# Patient Record
Sex: FEMALE | Race: BLACK OR AFRICAN AMERICAN | Employment: OTHER | URBAN - METROPOLITAN AREA
[De-identification: names, ages, dates, MRNs, and addresses within clinical notes are randomized per-mention and may not be internally consistent; named-entity substitution may affect disease eponyms.]

---

## 2019-08-13 ENCOUNTER — HOSPITAL ENCOUNTER (OUTPATIENT)
Dept: PHYSICAL THERAPY | Age: 64
Discharge: HOME | End: 2019-08-13
Payer: MEDICARE

## 2019-08-13 ENCOUNTER — APPOINTMENT (OUTPATIENT)
Dept: PHYSICAL THERAPY | Age: 64
End: 2019-08-13
Payer: MEDICARE

## 2019-08-13 PROCEDURE — 97140 MANUAL THERAPY 1/> REGIONS: CPT

## 2019-08-13 PROCEDURE — 97166 OT EVAL MOD COMPLEX 45 MIN: CPT

## 2019-08-13 NOTE — THERAPY EVALUATION
[address] [facility]  : 1955  [place]  Secondary: Bshsi Generic [address] [facility]  at [address] [facility]  [address], [address]  [phone]   [phone]           OUTPATIENT OCCUPATIONAL THERAPY: Initial Assessment and Daily Note 2019    ICD-10: Treatment Diagnosis: I89.0 lymphedema not elsewhere classified, M79.604 pain in right leg, M79.605 pain in left leg   Precautions/Allergies:   Augmentin [amoxicillin-pot clavulanate]   Fall Risk Score:    Ambulatory/Rehab Services H2 Model Falls Risk Assessment    Risk Factors:       No Risk Factors Identified Ability to Rise from Chair:       (1)  Pushes up, successful in one attempt    Falls Prevention Plan:       No modifications necessary   Total: (5 or greater = High Risk): 1     [employer]. All Rights Reserved. [facility] [employer] Patent #[id number]. Federal Law prohibits the replication, distribution or use without written permission from [employer].     MD Orders: eval and treat MEDICAL/REFERRING DIAGNOSIS:   Lymphedema, not elsewhere classified [I89.0]   DATE OF ONSET: chronic with worsening symptoms over the past 2 years    REFERRING PHYSICIAN: Parrish Doty MD  RETURN PHYSICIAN APPOINTMENT: to be determined      INITIAL ASSESSMENT:  Ms. Christi Guzman was referred to OT for the evaluation and treatment of bilateral LE lymphedema. She states swelling is chronic in nature and has gotten worse as her vein issues have gotten worse. She is being seen at [facility] to address her vein issues. DVT was ruled out. She wore 20-30mmHg compression knee highs for > 4 weeks with little success. LE pain, tingling is also present and along with the swelling has decreased her LE tolerance needed for standing and walking. She presents with Stage 2 LE lymphedema with dense fluid/pitting around the ankles, hyperpigmentation and pain.  She would benefit from OT for complete decongestive therapy to reduce swelling before transitioning to compression garments and home program.    PLAN OF CARE:   PROBLEM LIST:  1. Increased Pain  2. Decreased Activity Tolerance  3. Edema/Girth INTERVENTIONS PLANNED  1. Skin care  2. Compression bandaging  3. Fitting for compression garment(s)  4. Manual therapy/Manual lymph drainage  5. Therapeutic exercise/Therapeutic activities  6. Patient Education  7. Compression pump trial prn  8.  kinesiotaping    TREATMENT PLAN:  Effective Dates: 11/11/2019 Frequency/Duration: 2x/week up to 90 days  2 xweek x90 days  and upon reassessment. Will adjust frequency and duration as progress indicates. GOALS: (Goals have been discussed and agreed upon with patient.)  Short-Term Functional Goals: Time Frame: 45 days  1. The patient/caregiver will verbalize understanding of lymphedema precautions. 2. Patient will be independent with skin care regimen to decrease risk of cellulitis. 3. The patient/caregiver will be independent at donning and doffing bilatera lower extremity compression bandages. 4. The patient/caregiver will be independent with self-manual lymph drainage techniques and show decrease in limb volume. 5. Patient will be independent in lymphatic exercises. Discharge Goals: Time Frame: 90 days  1. Patient's bilateral lower extremity circumferential measurements will decrease on volumetric graph by 10-15cm to maximize functional use in ADL's.    2. The patient/caregiver will be independent with home management of lymphedema. 3. Patient/caregiver will be independent donning and doffing bilateral lower extremity compression garment. Rehabilitation Potential For Stated Goals: Good  Regarding [address] [facility] [address] therapy, I certify that the treatment plan above will be carried out by a therapist or under their direction.   Thank you for this referral,  Yevonne Kanner, OT     Referring Physician Signature: Osman Krishnan Ashlee Nash MD _________________________  Date _________            The information in this section was collected on 8/13/2019   (except where otherwise noted). OCCUPATIONAL PROFILE & HISTORY:   History of Present Injury/Illness (Reason for Referral):  Ms. Malick Godinez was referred to OT for the evaluation and treatment of bilateral LE lymphedema. She states swelling is chronic in nature and has gotten worse as her vein issues have gotten worse. She is being seen at [facility] to address her vein issues. DVT was ruled out. She wore 20-30mmHg compression knee highs for > 4 weeks with little success. LE pain, tingling is also present and along with the swelling has decreased her LE tolerance needed for standing and walking. She presents with Stage 2 LE lymphedema with dense fluid/pitting around the ankles, hyperpigmentation and pain. She would benefit from OT for complete decongestive therapy to reduce swelling before transitioning to compression garments and home program.   Past Medical History/Comorbidities:   Ms. Malick Godinez  has a past medical history of Hypertension and Thyroid Nodule. Ms. Malick Godinez  has a past surgical history that includes hx orthopaedic. PMH: diabetes, high blood pressure, high cholesterol, varicose veins,leg swelling and pain, lymphedema, DVT ruled out   Social History/Living Environment:     pt lives in home with spouse   Prior Level of Function/Work/Activity:  Retired / - has not been very active since retiring   Dominant Side:         RIGHT  Previous Treatment Approaches:          Conservative therapy : >4 weeks of compression knee highs   Current Medications:    Current Outpatient Medications:     loratadine (CLARITIN REDITABS) 10 mg dissolvable tablet, take 10 mg by mouth daily. , Disp: , Rfl:     lisinopril-hydrochlorothiazide (PRINZIDE, ZESTORETIC) 20-12.5 mg per tablet, take  by mouth daily.  2 tablets daily , Disp: , Rfl:     spironolactone (ALDACTONE) 25 mg tablet, take 25 mg by mouth daily. , Disp: , Rfl:     amlodipine-atorvastatatin (CADUET) 5-10 mg per tablet, take 1 Tab by mouth daily. , Disp: , Rfl:     metoprolol-XL (TOPROL-XL) 100 mg XL tablet, take 100 mg by mouth daily. , Disp: , Rfl:             Date Last Reviewed:  8/13/2019   Complexity Level : Expanded review of therapy/medical records (1-2):  MODERATE COMPLEXITY   ASSESSMENT OF OCCUPATIONAL PERFORMANCE:   Palpation:          Bilateral stage 2 lymphedema with greater involvement in the RLE: fluid has accumulated and is dense/pitting around ankles   ROM:          WFL  Strength:       WFL  Skin Integrity:          Skin is intact with hyperpigmentation and thickening present - multiple varicosities   Sensation:  Intact per pt   Functional Mobility:  Independent but with decreased activity tolerance  Activities of Daily Living : independent   Edema/Girth:  2+   PRETREATMENT AFFECTED LIMB(s): lower extremity      Date:  8-13-19         Right / Left           Groin   []      []           8 inches   []      []           4 inches   []      []         PoplitealSpace   [x]      [x] 47/48          8 inches   [x]      [x] 43/41          4 inches   [x]      [x] 33/30          Ankle   [x]      [x] 30.5/28          Instep   [x]      [x] 24.5/24.5        Measurements are taken in centimeters:  2.54 cm = 1 inch  Total:right 178cm        left 171.5              Physical Skills Involved:  1. Activity Tolerance  2. Pain (Chronic)  3. Edema  4. Skin Integrity Cognitive Skills Affected (resulting in the inability to perform in a timely and safe manner):  1. Psychosocial Skills Affected:  1. Habits/Routines  2. Social Interaction   Number of elements that affect the Plan of Care: 3-5:  MODERATE COMPLEXITY   CLINICAL DECISION MAKING:   Outcome Measure: Tool Used: Tool Used: Lymphedema Life Impact Scale   Score:  Initial: 48 Most Recent: X (Date: -- )   Interpretation of Score:  The Lymphedema Life Impact Scale (LLIS) is a validated instrument that measures the physical, functional, and psychosocial concerns pertinent to patients with extremity lymphedema. The Scale's questionnaire is administered to patients to gauge impairments, activity limitations, and participation restrictions resulting from their lymphedema. Score 0 1-13 14-26 27-40 41-54 55-67 68   Modifier CH CI CJ CK CL CM CN     ? Other PT/OT Primary Functional Limitations:     - CURRENT STATUS: CL - 60%-79% impaired, limited or restricted    - GOAL STATUS: CK - 40%-59% impaired, limited or restricted    - D/C STATUS:  ---------------To be determined---------------       Medical Necessity:   · Skilled intervention continues to be required due to uncontrolled swelling increasing risk of cellulitis and hindering ADL's. Reason for Services/Other Comments:  · Patient continues to require skilled intervention due to inability to self manage lymphedema at this time. Clinical Decision-Making Assessment:     Use of outcome tool(s) and clinical judgement create a POC that gives a: Questionable prediction of patient's progress: MODERATE COMPLEXITY   TREATMENT:   (In addition to Assessment/Re-Assessment sessions the following treatments were rendered)    Pre-treatment Symptoms/Complaints:  Bilateral LE lymphedema , LE pain, decreased LE activity tolerance   Pain: Initial:   Pain Intensity 1: 8  Post Session:  8   Occupational Therapy Treatments:    OT eval(x  ) OT eval was completed. Pt received information on lymphedema and risk reduction/self management practices as outlined by the National Lymphedema Network. Therapeutic Exercise ( minutes):     HEP:  As above; handouts given to patient for all exercises. Manual Therapy:(30 min): Pt was educated on lymph pathology, CDT, skin integrity management and precautions. Treatment was initiated with skin care and MLD. Pt has vein procedure Thursday so will hold off on compression until after procedure. Manual Lymph Drainage:(30minutes) MLD to decrease bilateral LE lymphedema with instruction on deep breathing and self MLD          Lymph Nodes:    Cervical Supraclavicular Axillary Abdominal Inguinal Popliteal Antecubital   RIGHT []     [x]     [x]     [x]     [x]     [x]     []       LEFT []     [x]     [x]     [x]     [x]     [x]     []          Anastamoses:   Axillo-axillary Inguino-inguinal Axillo-inguinal Inguino-axillary   ANTERIOR []     []     []     [x]       POSTERIOR []     []     []     []       RIGHT []     []     []     [x]       LEFT []     []     []     [x]         Limbs:   []    RUE     []    LUE     [x]    RLE    [x]    LLE   Compression: Will initiate compression bandaging at next session. Treatment/Session Assessment:    · Response to Treatment:  Pt.'s goals for therapy is to decrease swelling and pain to improve her LE endurance needed for standing, walking. She has good support system in spouse and agrees with POC. Treatment will begin next week since she has vein procedure Thursday. · Compliance with Program/Exercises: Will assess as treatment progresses. · Recommendations/Intent for next treatment session: \"Next visit will focus on complete decongestive therapy\".   Total Treatment Duration:60min  OT Patient Time In/Time Out  Time In: 0100  Time Out: [address], OT

## 2019-08-19 ENCOUNTER — APPOINTMENT (OUTPATIENT)
Dept: PHYSICAL THERAPY | Age: 64
End: 2019-08-19
Payer: MEDICARE

## 2019-08-19 ENCOUNTER — HOSPITAL ENCOUNTER (OUTPATIENT)
Dept: PHYSICAL THERAPY | Age: 64
Discharge: HOME | End: 2019-08-19
Payer: MEDICARE

## 2019-08-22 ENCOUNTER — HOSPITAL ENCOUNTER (OUTPATIENT)
Dept: PHYSICAL THERAPY | Age: 64
Discharge: HOME | End: 2019-08-22
Payer: MEDICARE

## 2019-08-22 PROCEDURE — 97140 MANUAL THERAPY 1/> REGIONS: CPT

## 2019-08-22 NOTE — PROGRESS NOTES
Trinity Kaminski  : 1955  [place]  Secondary: Bshsi Generic [address] at [facility]  [address], [facility], [address]  [phone]   [phone]           OUTPATIENT OCCUPATIONAL THERAPY: Daily Note 2019    ICD-10: Treatment Diagnosis: I89.0 lymphedema not elsewhere classified, M79.604 pain in right leg, M79.605 pain in left leg   Precautions/Allergies:   Augmentin [amoxicillin-pot clavulanate]   Fall Risk Score:    Ambulatory/Rehab Services H2 Model Falls Risk Assessment    Risk Factors:       No Risk Factors Identified Ability to Rise from Chair:       (1)  Pushes up, successful in one attempt    Falls Prevention Plan:       No modifications necessary   Total: (5 or greater = High Risk): 1     [employer]. All Rights Reserved. [facility] [employer] Patent #[id number]. Federal Law prohibits the replication, distribution or use without written permission from [employer]     MD Orders: eval and treat MEDICAL/REFERRING DIAGNOSIS:   Lymphedema, not elsewhere classified [I89.0]   DATE OF ONSET: chronic with worsening symptoms over the past 2 years    REFERRING PHYSICIAN: Rea Gill MD  RETURN PHYSICIAN APPOINTMENT: to be determined      INITIAL ASSESSMENT:  Ms. Shayna Duenas was referred to OT for the evaluation and treatment of bilateral LE lymphedema. She states swelling is chronic in nature and has gotten worse as her vein issues have gotten worse. She is being seen at [facility] to address her vein issues. DVT was ruled out. She wore 20-30mmHg compression knee highs for > 4 weeks with little success. LE pain, tingling is also present and along with the swelling has decreased her LE tolerance needed for standing and walking. She presents with Stage 2 LE lymphedema with dense fluid/pitting around the ankles, hyperpigmentation and pain.  She would benefit from OT for complete decongestive therapy to reduce swelling before transitioning to compression garments and home program.    PLAN OF CARE:   PROBLEM LIST:  1. Increased Pain  2. Decreased Activity Tolerance  3. Edema/Girth INTERVENTIONS PLANNED  1. Skin care  2. Compression bandaging  3. Fitting for compression garment(s)  4. Manual therapy/Manual lymph drainage  5. Therapeutic exercise/Therapeutic activities  6. Patient Education  7. Compression pump trial prn  8.  kinesiotaping    TREATMENT PLAN:  Effective Dates: 11/11/2019 Frequency/Duration: 2x/week up to 90 days  2 xweek x90 days  and upon reassessment. Will adjust frequency and duration as progress indicates. GOALS: (Goals have been discussed and agreed upon with patient.)  Short-Term Functional Goals: Time Frame: 45 days  1. The patient/caregiver will verbalize understanding of lymphedema precautions. 2. Patient will be independent with skin care regimen to decrease risk of cellulitis. 3. The patient/caregiver will be independent at donning and doffing bilatera lower extremity compression bandages. 4. The patient/caregiver will be independent with self-manual lymph drainage techniques and show decrease in limb volume. 5. Patient will be independent in lymphatic exercises. Discharge Goals: Time Frame: 90 days  1. Patient's bilateral lower extremity circumferential measurements will decrease on volumetric graph by 10-15cm to maximize functional use in ADL's.    2. The patient/caregiver will be independent with home management of lymphedema. 3. Patient/caregiver will be independent donning and doffing bilateral lower extremity compression garment. Rehabilitation Potential For Stated Goals: Good  Regarding [address] [place] [address] Road therapy, I certify that the treatment plan above will be carried out by a therapist or under their direction.   Thank you for this referral,  Ras Lopez OT                 The information in this section was collected on 8/22/2019   (except where otherwise noted). OCCUPATIONAL PROFILE & HISTORY:   History of Present Injury/Illness (Reason for Referral):  Ms. Janneth Quijano was referred to OT for the evaluation and treatment of bilateral LE lymphedema. She states swelling is chronic in nature and has gotten worse as her vein issues have gotten worse. She is being seen at [facility] to address her vein issues. DVT was ruled out. She wore 20-30mmHg compression knee highs for > 4 weeks with little success. LE pain, tingling is also present and along with the swelling has decreased her LE tolerance needed for standing and walking. She presents with Stage 2 LE lymphedema with dense fluid/pitting around the ankles, hyperpigmentation and pain. She would benefit from OT for complete decongestive therapy to reduce swelling before transitioning to compression garments and home program.   Past Medical History/Comorbidities:   Ms. Janneth Quijano  has a past medical history of Hypertension and Thyroid Nodule. Ms. Janneth Quijano  has a past surgical history that includes hx orthopaedic. PMH: diabetes, high blood pressure, high cholesterol, varicose veins,leg swelling and pain, lymphedema, DVT ruled out   Social History/Living Environment:     pt lives in home with spouse   Prior Level of Function/Work/Activity:  Retired / - has not been very active since retiring   Dominant Side:         RIGHT  Previous Treatment Approaches:          Conservative therapy : >4 weeks of compression knee highs   Current Medications:    Current Outpatient Medications:     loratadine (CLARITIN REDITABS) 10 mg dissolvable tablet, take 10 mg by mouth daily. , Disp: , Rfl:     lisinopril-hydrochlorothiazide (PRINZIDE, ZESTORETIC) 20-12.5 mg per tablet, take  by mouth daily. 2 tablets daily , Disp: , Rfl:     spironolactone (ALDACTONE) 25 mg tablet, take 25 mg by mouth daily. , Disp: , Rfl:     amlodipine-atorvastatatin (CADUET) 5-10 mg per tablet, take 1 Tab by mouth daily. , Disp: , Rfl:     metoprolol-XL (TOPROL-XL) 100 mg XL tablet, take 100 mg by mouth daily. , Disp: , Rfl:             Date Last Reviewed:  8/22/2019   Complexity Level : Expanded review of therapy/medical records (1-2):  MODERATE COMPLEXITY   ASSESSMENT OF OCCUPATIONAL PERFORMANCE:   Palpation:          Bilateral stage 2 lymphedema with greater involvement in the RLE: fluid has accumulated and is dense/pitting around ankles   ROM:          WFL  Strength:       WFL  Skin Integrity:          Skin is intact with hyperpigmentation and thickening present - multiple varicosities   Sensation:  Intact per pt   Functional Mobility:  Independent but with decreased activity tolerance  Activities of Daily Living : independent   Edema/Girth:  2+   PRETREATMENT AFFECTED LIMB(s): lower extremity      Date:  8-13-19         Right / Left           Groin   []      []           8 inches   []      []           4 inches   []      []         PoplitealSpace   [x]      [x] 47/48          8 inches   [x]      [x] 43/41          4 inches   [x]      [x] 33/30          Ankle   [x]      [x] 30.5/28          Instep   [x]      [x] 24.5/24.5        Measurements are taken in centimeters:  2.54 cm = 1 inch  Total:right 178cm        left 171.5              Physical Skills Involved:  1. Activity Tolerance  2. Pain (Chronic)  3. Edema  4. Skin Integrity Cognitive Skills Affected (resulting in the inability to perform in a timely and safe manner):  1. Psychosocial Skills Affected:  1. Habits/Routines  2. Social Interaction   Number of elements that affect the Plan of Care: 3-5:  MODERATE COMPLEXITY   CLINICAL DECISION MAKING:   Outcome Measure: Tool Used: Tool Used: Lymphedema Life Impact Scale   Score:  Initial: 48 Most Recent: X (Date: -- )   Interpretation of Score:  The Lymphedema Life Impact Scale (LLIS) is a validated instrument that measures the physical, functional, and psychosocial concerns pertinent to patients with extremity lymphedema. The Scale's questionnaire is administered to patients to gauge impairments, activity limitations, and participation restrictions resulting from their lymphedema. Score 0 1-13 14-26 27-40 41-54 55-67 68   Modifier CH CI CJ CK CL CM CN     ? Other PT/OT Primary Functional Limitations:     - CURRENT STATUS: CL - 60%-79% impaired, limited or restricted    - GOAL STATUS: CK - 40%-59% impaired, limited or restricted    - D/C STATUS:  ---------------To be determined---------------       Medical Necessity:   · Skilled intervention continues to be required due to uncontrolled swelling increasing risk of cellulitis and hindering ADL's. Reason for Services/Other Comments:  · Patient continues to require skilled intervention due to inability to self manage lymphedema at this time. Clinical Decision-Making Assessment:     Use of outcome tool(s) and clinical judgement create a POC that gives a: Questionable prediction of patient's progress: MODERATE COMPLEXITY   TREATMENT:   (In addition to Assessment/Re-Assessment sessions the following treatments were rendered)    Pre-treatment Symptoms/Complaints:  She canceled appt last week and only scheduled one appt this week due to having conflicting appts at [facility]. She has been cleared for therapy/badnaging while having appts at [facility]   Pain: Initial:   Pain Intensity 1: 8  Post Session:  8   Occupational Therapy Treatments:    OT eval(x  ) OT eval was completed 8-13-19. Pt received information on lymphedema and risk reduction/self management practices as outlined by the National Lymphedema Network. Therapeutic Exercise ( minutes):     HEP:  As above; handouts given to patient for all exercises. Manual Therapy:(60 min): Pt received MLD, skin care and compression bandaging with instruction on self MLD and self bandaging.            Manual Lymph Drainage:(30minutes) MLD to decrease bilateral LE lymphedema with instruction on deep breathing and self MLD          Lymph Nodes:    Cervical Supraclavicular Axillary Abdominal Inguinal Popliteal Antecubital   RIGHT []     [x]     [x]     [x]     [x]     [x]     []       LEFT []     [x]     [x]     [x]     [x]     [x]     []          Anastamoses:   Axillo-axillary Inguino-inguinal Axillo-inguinal Inguino-axillary   ANTERIOR []     []     []     [x]       POSTERIOR []     []     []     []       RIGHT []     []     []     [x]       LEFT []     []     []     [x]         Limbs:   []    RUE     []    LUE     [x]    RLE    [x]    LLE   Compression: After applying Eucerin lotion, a multi layered compression bandage was applied to bilateral LE's from foot to knee over lymphesoft foam. She was instructed on principles/technqiues of self bandaging. Pt instructed to remove bandaging if any medical complications ie SOB. Treatment/Session Assessment:    · Response to Treatment:  Pt tolerated treatment session well with no medical complications. Skin was intact and addressed with Eucerin lotion. She was interested in learning how to bandage for home program.   · Compliance with Program/Exercises: compliant to date  · Recommendations/Intent for next treatment session: \"Next visit will focus on complete decongestive therapy\".   Total Treatment Duration:60min  OT Patient Time In/Time Out  Time In: 1100  Time Out: [address] [facility]

## 2019-08-27 ENCOUNTER — APPOINTMENT (OUTPATIENT)
Dept: PHYSICAL THERAPY | Age: 64
End: 2019-08-27
Payer: MEDICARE

## 2019-08-29 ENCOUNTER — HOSPITAL ENCOUNTER (OUTPATIENT)
Dept: PHYSICAL THERAPY | Age: 64
Discharge: HOME | End: 2019-08-29
Payer: MEDICARE

## 2019-08-29 PROCEDURE — 97140 MANUAL THERAPY 1/> REGIONS: CPT

## 2019-08-29 NOTE — PROGRESS NOTES
Luis Chandler  : 1955  [place]  Secondary: Bshsi Generic [address] [facility]  at [address], [address]  [phone]   [phone]           OUTPATIENT OCCUPATIONAL THERAPY: Daily Note 2019    ICD-10: Treatment Diagnosis: I89.0 lymphedema not elsewhere classified, M79.604 pain in right leg, M79.605 pain in left leg   Precautions/Allergies:   Augmentin [amoxicillin-pot clavulanate]   Fall Risk Score:    Ambulatory/Rehab Services H2 Model Falls Risk Assessment    Risk Factors:       No Risk Factors Identified Ability to Rise from Chair:       (1)  Pushes up, successful in one attempt    Falls Prevention Plan:       No modifications necessary   Total: (5 or greater = High Risk): 1     [employer]. All Rights Reserved. [facility] [employer] Patent #[id number]. Federal Law prohibits the replication, distribution or use without written permission from [employer]     MD Orders: eval and treat MEDICAL/REFERRING DIAGNOSIS:   Lymphedema, not elsewhere classified [I89.0]   DATE OF ONSET: chronic with worsening symptoms over the past 2 years    REFERRING PHYSICIAN: Solange Fisher MD  RETURN PHYSICIAN APPOINTMENT: to be determined      INITIAL ASSESSMENT:  Ms. Александр Haas was referred to OT for the evaluation and treatment of bilateral LE lymphedema. She states swelling is chronic in nature and has gotten worse as her vein issues have gotten worse. She is being seen at [facility] to address her vein issues. DVT was ruled out. She wore 20-30mmHg compression knee highs for > 4 weeks with little success. LE pain, tingling is also present and along with the swelling has decreased her LE tolerance needed for standing and walking. She presents with Stage 2 LE lymphedema with dense fluid/pitting around the ankles, hyperpigmentation and pain.  She would benefit from OT for complete decongestive therapy to reduce swelling before transitioning to compression garments and home program.    PLAN OF CARE:   PROBLEM LIST:  1. Increased Pain  2. Decreased Activity Tolerance  3. Edema/Girth INTERVENTIONS PLANNED  1. Skin care  2. Compression bandaging  3. Fitting for compression garment(s)  4. Manual therapy/Manual lymph drainage  5. Therapeutic exercise/Therapeutic activities  6. Patient Education  7. Compression pump trial prn  8.  kinesiotaping    TREATMENT PLAN:  Effective Dates: 11/11/2019 Frequency/Duration: 2x/week up to 90 days  2 xweek x90 days  and upon reassessment. Will adjust frequency and duration as progress indicates. GOALS: (Goals have been discussed and agreed upon with patient.)  Short-Term Functional Goals: Time Frame: 45 days  1. The patient/caregiver will verbalize understanding of lymphedema precautions. 2. Patient will be independent with skin care regimen to decrease risk of cellulitis. 3. The patient/caregiver will be independent at donning and doffing bilatera lower extremity compression bandages. 4. The patient/caregiver will be independent with self-manual lymph drainage techniques and show decrease in limb volume. 5. Patient will be independent in lymphatic exercises. Discharge Goals: Time Frame: 90 days  1. Patient's bilateral lower extremity circumferential measurements will decrease on volumetric graph by 10-15cm to maximize functional use in ADL's.    2. The patient/caregiver will be independent with home management of lymphedema. 3. Patient/caregiver will be independent donning and doffing bilateral lower extremity compression garment. Rehabilitation Potential For Stated Goals: Good  Regarding [address] [facility] [address] therapy, I certify that the treatment plan above will be carried out by a therapist or under their direction.   Thank you for this referral,  Geovanna Moya OT                 The information in this section was collected on 8/29/2019   (except where otherwise noted). OCCUPATIONAL PROFILE & HISTORY:   History of Present Injury/Illness (Reason for Referral):  Ms. Mynor Feliciano was referred to OT for the evaluation and treatment of bilateral LE lymphedema. She states swelling is chronic in nature and has gotten worse as her vein issues have gotten worse. She is being seen at [facility] to address her vein issues. DVT was ruled out. She wore 20-30mmHg compression knee highs for > 4 weeks with little success. LE pain, tingling is also present and along with the swelling has decreased her LE tolerance needed for standing and walking. She presents with Stage 2 LE lymphedema with dense fluid/pitting around the ankles, hyperpigmentation and pain. She would benefit from OT for complete decongestive therapy to reduce swelling before transitioning to compression garments and home program.   Past Medical History/Comorbidities:   Ms. Mynor Feliciano  has a past medical history of Hypertension and Thyroid Nodule. Ms. Mynor Feliciano  has a past surgical history that includes hx orthopaedic. PMH: diabetes, high blood pressure, high cholesterol, varicose veins,leg swelling and pain, lymphedema, DVT ruled out   Social History/Living Environment:     pt lives in home with spouse   Prior Level of Function/Work/Activity:  Retired / - has not been very active since retiring   Dominant Side:         RIGHT  Previous Treatment Approaches:          Conservative therapy : >4 weeks of compression knee highs   Current Medications:    Current Outpatient Medications:     loratadine (CLARITIN REDITABS) 10 mg dissolvable tablet, take 10 mg by mouth daily. , Disp: , Rfl:     lisinopril-hydrochlorothiazide (PRINZIDE, ZESTORETIC) 20-12.5 mg per tablet, take  by mouth daily. 2 tablets daily , Disp: , Rfl:     spironolactone (ALDACTONE) 25 mg tablet, take 25 mg by mouth daily. , Disp: , Rfl:     amlodipine-atorvastatatin (CADUET) 5-10 mg per tablet, take 1 Tab by mouth daily. , Disp: , Rfl:     metoprolol-XL (TOPROL-XL) 100 mg XL tablet, take 100 mg by mouth daily. , Disp: , Rfl:             Date Last Reviewed:  8/29/2019   Complexity Level : Expanded review of therapy/medical records (1-2):  MODERATE COMPLEXITY   ASSESSMENT OF OCCUPATIONAL PERFORMANCE:   Palpation:          Bilateral stage 2 lymphedema with greater involvement in the RLE: fluid has accumulated and is dense/pitting around ankles   ROM:          WFL  Strength:       WFL  Skin Integrity:          Skin is intact with hyperpigmentation and thickening present - multiple varicosities   Sensation:  Intact per pt   Functional Mobility:  Independent but with decreased activity tolerance  Activities of Daily Living : independent   Edema/Girth:  2+   PRETREATMENT AFFECTED LIMB(s): lower extremity      Date:  8-13-19         Right / Left           Groin   []      []           8 inches   []      []           4 inches   []      []         PoplitealSpace   [x]      [x] 47/48          8 inches   [x]      [x] 43/41          4 inches   [x]      [x] 33/30          Ankle   [x]      [x] 30.5/28          Instep   [x]      [x] 24.5/24.5        Measurements are taken in centimeters:  2.54 cm = 1 inch  Total:right 178cm        left 171.5              Physical Skills Involved:  1. Activity Tolerance  2. Pain (Chronic)  3. Edema  4. Skin Integrity Cognitive Skills Affected (resulting in the inability to perform in a timely and safe manner):  1. Psychosocial Skills Affected:  1. Habits/Routines  2. Social Interaction   Number of elements that affect the Plan of Care: 3-5:  MODERATE COMPLEXITY   CLINICAL DECISION MAKING:   Outcome Measure: Tool Used: Tool Used: Lymphedema Life Impact Scale   Score:  Initial: 48 Most Recent: X (Date: -- )   Interpretation of Score:  The Lymphedema Life Impact Scale (LLIS) is a validated instrument that measures the physical, functional, and psychosocial concerns pertinent to patients with extremity lymphedema. The Scale's questionnaire is administered to patients to gauge impairments, activity limitations, and participation restrictions resulting from their lymphedema. Score 0 1-13 14-26 27-40 41-54 55-67 68   Modifier CH CI CJ CK CL CM CN     ? Other PT/OT Primary Functional Limitations:     - CURRENT STATUS: CL - 60%-79% impaired, limited or restricted    - GOAL STATUS: CK - 40%-59% impaired, limited or restricted    - D/C STATUS:  ---------------To be determined---------------       Medical Necessity:   · Skilled intervention continues to be required due to uncontrolled swelling increasing risk of cellulitis and hindering ADL's. Reason for Services/Other Comments:  · Patient continues to require skilled intervention due to inability to self manage lymphedema at this time. Clinical Decision-Making Assessment:     Use of outcome tool(s) and clinical judgement create a POC that gives a: Questionable prediction of patient's progress: MODERATE COMPLEXITY   TREATMENT:   (In addition to Assessment/Re-Assessment sessions the following treatments were rendered)    Pre-treatment Symptoms/Complaints:  Pt is practicing self bandaging at home. She is knowledgeable of principles and techniques. Pain decreased by 1 grade. Pain: Initial:   Pain Intensity 1: 7  Post Session: 7   Occupational Therapy Treatments:    OT eval(x  ) OT eval was completed 8-13-19. Pt received information on lymphedema and risk reduction/self management practices as outlined by the National Lymphedema Network. Therapeutic Exercise ( minutes):     HEP:  As above; handouts given to patient for all exercises. Manual Therapy:(60 min): Pt received MLD, skin care and compression bandaging with instruction on self MLD and self bandaging.            Manual Lymph Drainage:(60minutes) MLD to decrease bilateral LE lymphedema with instruction on deep breathing and self MLD          Lymph Nodes:    Cervical Supraclavicular Axillary Abdominal Inguinal Popliteal Antecubital   RIGHT []     [x]     [x]     [x]     [x]     [x]     []       LEFT []     [x]     [x]     [x]     [x]     [x]     []          Anastamoses:   Axillo-axillary Inguino-inguinal Axillo-inguinal Inguino-axillary   ANTERIOR []     []     []     [x]       POSTERIOR []     []     []     []       RIGHT []     []     []     [x]       LEFT []     []     []     [x]         Limbs:   []    RUE     []    LUE     [x]    RLE    [x]    LLE   Compression: After applying Eucerin lotion, a multi layered compression bandage was applied to bilateral LE's from foot to knee over lymphesoft foam. She was instructed on principles/technqiues of self bandaging. Pt instructed to remove bandaging if any medical complications ie SOB. Treatment/Session Assessment:    · Response to Treatment:  Pt tolerated treatment session well with no medical complications. Skin was intact and addressed with Eucerin lotion. She is tolerating bandages well and pain has decreased by 1 grade. · Compliance with Program/Exercises: compliant to date  · Recommendations/Intent for next treatment session: \"Next visit will focus on complete decongestive therapy\".   Total Treatment Duration:60min  OT Patient Time In/Time Out  Time In: 0120  Time Out: 7 [facility]

## 2019-09-03 ENCOUNTER — APPOINTMENT (OUTPATIENT)
Dept: PHYSICAL THERAPY | Age: 64
End: 2019-09-03
Payer: MEDICARE

## 2019-09-05 ENCOUNTER — HOSPITAL ENCOUNTER (OUTPATIENT)
Dept: PHYSICAL THERAPY | Age: 64
Discharge: HOME | End: 2019-09-05
Payer: MEDICARE

## 2019-09-05 PROCEDURE — 97140 MANUAL THERAPY 1/> REGIONS: CPT

## 2019-09-05 NOTE — PROGRESS NOTES
Pravin Steele  : 1955  [place]  Secondary: Bshsi Generic [address] at [facility] Therapy  [address], [facility], [address]  [phone]   [phone]           OUTPATIENT OCCUPATIONAL THERAPY: Daily Note 2019    ICD-10: Treatment Diagnosis: I89.0 lymphedema not elsewhere classified, M79.604 pain in right leg, M79.605 pain in left leg   Precautions/Allergies:   Augmentin [amoxicillin-pot clavulanate]   Fall Risk Score:    Ambulatory/Rehab Services H2 Model Falls Risk Assessment    Risk Factors:       No Risk Factors Identified Ability to Rise from Chair:       (1)  Pushes up, successful in one attempt    Falls Prevention Plan:       No modifications necessary   Total: (5 or greater = High Risk): 1     [facility] [employer]. All Rights Reserved. [facility] [employer] Patent #[id number]. Federal Law prohibits the replication, distribution or use without written permission from [employer]     MD Orders: eval and treat MEDICAL/REFERRING DIAGNOSIS:   Lymphedema, not elsewhere classified [I89.0]   DATE OF ONSET: chronic with worsening symptoms over the past 2 years    REFERRING PHYSICIAN: Kari Cobos MD  RETURN PHYSICIAN APPOINTMENT: to be determined      INITIAL ASSESSMENT:  Ms. Ambreen Cedillo was referred to OT for the evaluation and treatment of bilateral LE lymphedema. She states swelling is chronic in nature and has gotten worse as her vein issues have gotten worse. She is being seen at [facility] to address her vein issues. DVT was ruled out. She wore 20-30mmHg compression knee highs for > 4 weeks with little success. LE pain, tingling is also present and along with the swelling has decreased her LE tolerance needed for standing and walking. She presents with Stage 2 LE lymphedema with dense fluid/pitting around the ankles, hyperpigmentation and pain.  She would benefit from OT for complete decongestive therapy to reduce swelling before transitioning to compression garments and home program.    PLAN OF CARE:   PROBLEM LIST:  1. Increased Pain  2. Decreased Activity Tolerance  3. Edema/Girth INTERVENTIONS PLANNED  1. Skin care  2. Compression bandaging  3. Fitting for compression garment(s)  4. Manual therapy/Manual lymph drainage  5. Therapeutic exercise/Therapeutic activities  6. Patient Education  7. Compression pump trial prn  8.  kinesiotaping    TREATMENT PLAN:  Effective Dates: 11/11/2019 Frequency/Duration: 2x/week up to 90 days  2 xweek x90 days  and upon reassessment. Will adjust frequency and duration as progress indicates. GOALS: (Goals have been discussed and agreed upon with patient.)  Short-Term Functional Goals: Time Frame: 45 days  1. The patient/caregiver will verbalize understanding of lymphedema precautions. 2. Patient will be independent with skin care regimen to decrease risk of cellulitis. 3. The patient/caregiver will be independent at donning and doffing bilatera lower extremity compression bandages. 4. The patient/caregiver will be independent with self-manual lymph drainage techniques and show decrease in limb volume. 5. Patient will be independent in lymphatic exercises. Discharge Goals: Time Frame: 90 days  1. Patient's bilateral lower extremity circumferential measurements will decrease on volumetric graph by 10-15cm to maximize functional use in ADL's.    2. The patient/caregiver will be independent with home management of lymphedema. 3. Patient/caregiver will be independent donning and doffing bilateral lower extremity compression garment. Rehabilitation Potential For Stated Goals: Good  Regarding [address] [place] [address] Road therapy, I certify that the treatment plan above will be carried out by a therapist or under their direction.   Thank you for this referral,  Malia Morris OT                 The information in this section was collected on 9/5/2019   (except where otherwise noted). OCCUPATIONAL PROFILE & HISTORY:   History of Present Injury/Illness (Reason for Referral):  Ms. Mynor Feliciano was referred to OT for the evaluation and treatment of bilateral LE lymphedema. She states swelling is chronic in nature and has gotten worse as her vein issues have gotten worse. She is being seen at [facility] to address her vein issues. DVT was ruled out. She wore 20-30mmHg compression knee highs for > 4 weeks with little success. LE pain, tingling is also present and along with the swelling has decreased her LE tolerance needed for standing and walking. She presents with Stage 2 LE lymphedema with dense fluid/pitting around the ankles, hyperpigmentation and pain. She would benefit from OT for complete decongestive therapy to reduce swelling before transitioning to compression garments and home program.   Past Medical History/Comorbidities:   Ms. Mynor Feliciano  has a past medical history of Hypertension and Thyroid Nodule. Ms. Mynor Feliciano  has a past surgical history that includes hx orthopaedic. PMH: diabetes, high blood pressure, high cholesterol, varicose veins,leg swelling and pain, lymphedema, DVT ruled out   Social History/Living Environment:     pt lives in home with spouse   Prior Level of Function/Work/Activity:  Retired / - has not been very active since retiring   Dominant Side:         RIGHT  Previous Treatment Approaches:          Conservative therapy : >4 weeks of compression knee highs   Current Medications:    Current Outpatient Medications:     loratadine (CLARITIN REDITABS) 10 mg dissolvable tablet, take 10 mg by mouth daily. , Disp: , Rfl:     lisinopril-hydrochlorothiazide (PRINZIDE, ZESTORETIC) 20-12.5 mg per tablet, take  by mouth daily. 2 tablets daily , Disp: , Rfl:     spironolactone (ALDACTONE) 25 mg tablet, take 25 mg by mouth daily. , Disp: , Rfl:     amlodipine-atorvastatatin (CADUET) 5-10 mg per tablet, take 1 Tab by mouth daily. , Disp: , Rfl:     metoprolol-XL (TOPROL-XL) 100 mg XL tablet, take 100 mg by mouth daily. , Disp: , Rfl:             Date Last Reviewed:  9/5/2019   Complexity Level : Expanded review of therapy/medical records (1-2):  MODERATE COMPLEXITY   ASSESSMENT OF OCCUPATIONAL PERFORMANCE:   Palpation:          Bilateral stage 2 lymphedema with greater involvement in the RLE: fluid has accumulated and is dense/pitting around ankles   ROM:          WFL  Strength:       WFL  Skin Integrity:          Skin is intact with hyperpigmentation and thickening present - multiple varicosities   Sensation:  Intact per pt   Functional Mobility:  Independent but with decreased activity tolerance  Activities of Daily Living : independent   Edema/Girth:  2+   PRETREATMENT AFFECTED LIMB(s): lower extremity      Date:  8-13-19 9-5-19        Right / Left           Groin   []      []           8 inches   []      []           4 inches   []      []         PoplitealSpace   [x]      [x] 47/48 44/44         8 inches   [x]      [x] 43/41 38/38         4 inches   [x]      [x] 33/30 31.5/28.5         Ankle   [x]      [x] 30.5/28 27/28         Instep   [x]      [x] 24.5/24.5 23/23       Measurements are taken in centimeters:  2.54 cm = 1 inch  Total:right 178cm 163.5       left 171.5 161.5             Physical Skills Involved:  1. Activity Tolerance  2. Pain (Chronic)  3. Edema  4. Skin Integrity Cognitive Skills Affected (resulting in the inability to perform in a timely and safe manner):  1. Psychosocial Skills Affected:  1. Habits/Routines  2. Social Interaction   Number of elements that affect the Plan of Care: 3-5:  MODERATE COMPLEXITY   CLINICAL DECISION MAKING:   Outcome Measure: Tool Used: Tool Used: Lymphedema Life Impact Scale   Score:  Initial: 48 Most Recent: X (Date: -- )   Interpretation of Score:  The Lymphedema Life Impact Scale (LLIS) is a validated instrument that measures the physical, functional, and psychosocial concerns pertinent to patients with extremity lymphedema. The Scale's questionnaire is administered to patients to gauge impairments, activity limitations, and participation restrictions resulting from their lymphedema. Score 0 1-13 14-26 27-40 41-54 55-67 68   Modifier CH CI CJ CK CL CM CN     ? Other PT/OT Primary Functional Limitations:     - CURRENT STATUS: CL - 60%-79% impaired, limited or restricted    - GOAL STATUS: CK - 40%-59% impaired, limited or restricted    - D/C STATUS:  ---------------To be determined---------------       Medical Necessity:   · Skilled intervention continues to be required due to uncontrolled swelling increasing risk of cellulitis and hindering ADL's. Reason for Services/Other Comments:  · Patient continues to require skilled intervention due to inability to self manage lymphedema at this time. Clinical Decision-Making Assessment:     Use of outcome tool(s) and clinical judgement create a POC that gives a: Questionable prediction of patient's progress: MODERATE COMPLEXITY   TREATMENT:   (In addition to Assessment/Re-Assessment sessions the following treatments were rendered)    Pre-treatment Symptoms/Complaints:  Overall total of circumferential measurements decreased by 14.5cm in the RLE and 10cm in the LLE. Pain has decreased by 3grades. Pain: Initial:   Pain Intensity 1: 5  Post Session: 5   Occupational Therapy Treatments:    OT eval(x  ) OT eval was completed 8-13-19. Pt received information on lymphedema and risk reduction/self management practices as outlined by the National Lymphedema Network. Therapeutic Exercise ( minutes):     HEP:  As above; handouts given to patient for all exercises. Manual Therapy:(60 min): Pt received MLD, skin care and compression bandaging with instruction on LE exercises to promote lymph flow. Measurements made of LE's.            Manual Lymph Drainage:(60minutes) MLD to decrease bilateral LE lymphedema with instruction on deep breathing and self MLD          Lymph Nodes:    Cervical Supraclavicular Axillary Abdominal Inguinal Popliteal Antecubital   RIGHT []     [x]     [x]     [x]     [x]     [x]     []       LEFT []     [x]     [x]     [x]     [x]     [x]     []          Anastamoses:   Axillo-axillary Inguino-inguinal Axillo-inguinal Inguino-axillary   ANTERIOR []     []     []     [x]       POSTERIOR []     []     []     []       RIGHT []     []     []     [x]       LEFT []     []     []     [x]         Limbs:   []    RUE     []    LUE     [x]    RLE    [x]    LLE   Compression: After applying Eucerin lotion, a multi layered compression bandage was applied to bilateral LE's from foot to knee over lymphesoft foam. She was instructed on principles/technqiues of self bandaging. Pt instructed to remove bandaging if any medical complications ie SOB. Treatment/Session Assessment:    · Response to Treatment:  Pt tolerated treatment session well with no medical complications. Skin was intact and addressed with Eucerin lotion. She is making good progress towards OT goals. · Compliance with Program/Exercises: compliant to date  · Recommendations/Intent for next treatment session: \"Next visit will focus on complete decongestive therapy\".   Total Treatment Duration:60min  OT Patient Time In/Time Out  Time In: 1200  Time Out: [address], OT

## 2019-09-09 ENCOUNTER — HOSPITAL ENCOUNTER (OUTPATIENT)
Dept: PHYSICAL THERAPY | Age: 64
Discharge: HOME | End: 2019-09-09
Payer: MEDICARE

## 2019-09-09 NOTE — PROGRESS NOTES
Lorraine Bazan  : 1955  [place]  Secondary: [employer] [facility]  [address] [place] [address], [facility] [address]  [phone]   [phone]      OUTPATIENT DAILY NOTE    NAME/AGE/GENDER: Lorraine Bazan is a 75 y.o. female. DATE: 2019    Ms. Madison Bautista CANCELED for today's appointment due to scheduling conflict.     Keenan Arriola, OT

## 2019-09-12 ENCOUNTER — HOSPITAL ENCOUNTER (OUTPATIENT)
Dept: PHYSICAL THERAPY | Age: 64
Discharge: HOME | End: 2019-09-12
Payer: MEDICARE

## 2019-09-12 PROCEDURE — 97140 MANUAL THERAPY 1/> REGIONS: CPT

## 2019-09-12 NOTE — PROGRESS NOTES
Jonathan Nail  : 1955  [place]  Secondary: [employer] Generic [address] at [facility]  [address], [facility], [address]  [phone]   [phone]           OUTPATIENT OCCUPATIONAL THERAPY: Daily Note 2019    ICD-10: Treatment Diagnosis: I89.0 lymphedema not elsewhere classified, M79.604 pain in right leg, M79.605 pain in left leg   Precautions/Allergies:   Augmentin [amoxicillin-pot clavulanate]   Fall Risk Score:    Ambulatory/Rehab Services H2 Model Falls Risk Assessment    Risk Factors:       No Risk Factors Identified Ability to Rise from Chair:       (1)  Pushes up, successful in one attempt    Falls Prevention Plan:       No modifications necessary   Total: (5 or greater = High Risk): 1     [facility] [employer]. All Rights Reserved. [facility] [employer] Patent #[id number]. Federal Law prohibits the replication, distribution or use without written permission from [employer]     MD Orders: eval and treat MEDICAL/REFERRING DIAGNOSIS:   Lymphedema, not elsewhere classified [I89.0]   DATE OF ONSET: chronic with worsening symptoms over the past 2 years    REFERRING PHYSICIAN: Parrish Doty MD  RETURN PHYSICIAN APPOINTMENT: to be determined      INITIAL ASSESSMENT:  Ms. Christi Guzman was referred to OT for the evaluation and treatment of bilateral LE lymphedema. She states swelling is chronic in nature and has gotten worse as her vein issues have gotten worse. She is being seen at [facility] to address her vein issues. DVT was ruled out. She wore 20-30mmHg compression knee highs for > 4 weeks with little success. LE pain, tingling is also present and along with the swelling has decreased her LE tolerance needed for standing and walking. She presents with Stage 2 LE lymphedema with dense fluid/pitting around the ankles, hyperpigmentation and pain.  She would benefit from OT for complete decongestive therapy to reduce swelling before transitioning to compression garments and home program.    PLAN OF CARE:   PROBLEM LIST:  1. Increased Pain  2. Decreased Activity Tolerance  3. Edema/Girth INTERVENTIONS PLANNED  1. Skin care  2. Compression bandaging  3. Fitting for compression garment(s)  4. Manual therapy/Manual lymph drainage  5. Therapeutic exercise/Therapeutic activities  6. Patient Education  7. Compression pump trial prn  8.  kinesiotaping    TREATMENT PLAN:  Effective Dates: 11/11/2019 Frequency/Duration: 2x/week up to 90 days  2 xweek x90 days  and upon reassessment. Will adjust frequency and duration as progress indicates. GOALS: (Goals have been discussed and agreed upon with patient.)  Short-Term Functional Goals: Time Frame: 45 days  1. The patient/caregiver will verbalize understanding of lymphedema precautions. 2. Patient will be independent with skin care regimen to decrease risk of cellulitis. 3. The patient/caregiver will be independent at donning and doffing bilatera lower extremity compression bandages. 4. The patient/caregiver will be independent with self-manual lymph drainage techniques and show decrease in limb volume. 5. Patient will be independent in lymphatic exercises. Discharge Goals: Time Frame: 90 days  1. Patient's bilateral lower extremity circumferential measurements will decrease on volumetric graph by 10-15cm to maximize functional use in ADL's.    2. The patient/caregiver will be independent with home management of lymphedema. 3. Patient/caregiver will be independent donning and doffing bilateral lower extremity compression garment. Rehabilitation Potential For Stated Goals: Good  Regarding [address] [place] [address] Road therapy, I certify that the treatment plan above will be carried out by a therapist or under their direction.   Thank you for this referral,  Francine Perera OT                 The information in this section was collected on 9/12/2019   (except where otherwise noted). OCCUPATIONAL PROFILE & HISTORY:   History of Present Injury/Illness (Reason for Referral):  Ms. Carmelina Mendoza was referred to OT for the evaluation and treatment of bilateral LE lymphedema. She states swelling is chronic in nature and has gotten worse as her vein issues have gotten worse. She is being seen at [facility] to address her vein issues. DVT was ruled out. She wore 20-30mmHg compression knee highs for > 4 weeks with little success. LE pain, tingling is also present and along with the swelling has decreased her LE tolerance needed for standing and walking. She presents with Stage 2 LE lymphedema with dense fluid/pitting around the ankles, hyperpigmentation and pain. She would benefit from OT for complete decongestive therapy to reduce swelling before transitioning to compression garments and home program.   Past Medical History/Comorbidities:   Ms. Carmelina Mendoza  has a past medical history of Hypertension and Thyroid Nodule. Ms. Carmelina Mendoza  has a past surgical history that includes hx orthopaedic. PMH: diabetes, high blood pressure, high cholesterol, varicose veins,leg swelling and pain, lymphedema, DVT ruled out   Social History/Living Environment:     pt lives in home with spouse   Prior Level of Function/Work/Activity:  Retired / - has not been very active since retiring   Dominant Side:         RIGHT  Previous Treatment Approaches:          Conservative therapy : >4 weeks of compression knee highs   Current Medications:    Current Outpatient Medications:     loratadine (CLARITIN REDITABS) 10 mg dissolvable tablet, take 10 mg by mouth daily. , Disp: , Rfl:     lisinopril-hydrochlorothiazide (PRINZIDE, ZESTORETIC) 20-12.5 mg per tablet, take  by mouth daily. 2 tablets daily , Disp: , Rfl:     spironolactone (ALDACTONE) 25 mg tablet, take 25 mg by mouth daily. , Disp: , Rfl:     amlodipine-atorvastatatin (CADUET) 5-10 mg per tablet, take 1 Tab by mouth daily. , Disp: , Rfl:     metoprolol-XL (TOPROL-XL) 100 mg XL tablet, take 100 mg by mouth daily. , Disp: , Rfl:             Date Last Reviewed:  9/12/2019   Complexity Level : Expanded review of therapy/medical records (1-2):  MODERATE COMPLEXITY   ASSESSMENT OF OCCUPATIONAL PERFORMANCE:   Palpation:          Bilateral stage 2 lymphedema with greater involvement in the RLE: fluid has accumulated and is dense/pitting around ankles   ROM:          WFL  Strength:       WFL  Skin Integrity:          Skin is intact with hyperpigmentation and thickening present - multiple varicosities   Sensation:  Intact per pt   Functional Mobility:  Independent but with decreased activity tolerance  Activities of Daily Living : independent   Edema/Girth:  2+   PRETREATMENT AFFECTED LIMB(s): lower extremity      Date:  8-13-19 9-5-19        Right / Left           Groin   []      []           8 inches   []      []           4 inches   []      []         PoplitealSpace   [x]      [x] 47/48 44/44         8 inches   [x]      [x] 43/41 38/38         4 inches   [x]      [x] 33/30 31.5/28.5         Ankle   [x]      [x] 30.5/28 27/28         Instep   [x]      [x] 24.5/24.5 23/23       Measurements are taken in centimeters:  2.54 cm = 1 inch  Total:right 178cm 163.5       left 171.5 161.5             Physical Skills Involved:  1. Activity Tolerance  2. Pain (Chronic)  3. Edema  4. Skin Integrity Cognitive Skills Affected (resulting in the inability to perform in a timely and safe manner):  1. Psychosocial Skills Affected:  1. Habits/Routines  2. Social Interaction   Number of elements that affect the Plan of Care: 3-5:  MODERATE COMPLEXITY   CLINICAL DECISION MAKING:   Outcome Measure: Tool Used: Tool Used: Lymphedema Life Impact Scale   Score:  Initial: 48 Most Recent: X (Date: -- )   Interpretation of Score:  The Lymphedema Life Impact Scale (LLIS) is a validated instrument that measures the physical, functional, and psychosocial concerns pertinent to patients with extremity lymphedema. The Scale's questionnaire is administered to patients to gauge impairments, activity limitations, and participation restrictions resulting from their lymphedema. Score 0 1-13 14-26 27-40 41-54 55-67 68   Modifier CH CI CJ CK CL CM CN     ? Other PT/OT Primary Functional Limitations:     - CURRENT STATUS: CL - 60%-79% impaired, limited or restricted    - GOAL STATUS: CK - 40%-59% impaired, limited or restricted    - D/C STATUS:  ---------------To be determined---------------       Medical Necessity:   · Skilled intervention continues to be required due to uncontrolled swelling increasing risk of cellulitis and hindering ADL's. Reason for Services/Other Comments:  · Patient continues to require skilled intervention due to inability to self manage lymphedema at this time. Clinical Decision-Making Assessment:     Use of outcome tool(s) and clinical judgement create a POC that gives a: Questionable prediction of patient's progress: MODERATE COMPLEXITY   TREATMENT:   (In addition to Assessment/Re-Assessment sessions the following treatments were rendered)    Pre-treatment Symptoms/Complaints:  Overall total of circumferential measurements decreased by 14.5cm in the RLE and 10cm in the LLE. Pain has decreased by 3grades. She is progressing towards compression garments. Pain: Initial:   Pain Intensity 1: 5  Post Session: 5   Occupational Therapy Treatments:    OT eval(x  ) OT eval was completed 8-13-19. Pt received information on lymphedema and risk reduction/self management practices as outlined by the National Lymphedema Network. Therapeutic Exercise ( minutes):     HEP:  As above; handouts given to patient for all exercises. Manual Therapy:(60 min): Pt received MLD, skin care and compression bandaging with instruction on LE exercises to promote lymph flow.            Manual Lymph Drainage:(60minutes) MLD to decrease bilateral LE lymphedema with instruction on deep breathing and self MLD          Lymph Nodes:    Cervical Supraclavicular Axillary Abdominal Inguinal Popliteal Antecubital   RIGHT []     [x]     [x]     [x]     [x]     [x]     []       LEFT []     [x]     [x]     [x]     [x]     [x]     []          Anastamoses:   Axillo-axillary Inguino-inguinal Axillo-inguinal Inguino-axillary   ANTERIOR []     []     []     [x]       POSTERIOR []     []     []     []       RIGHT []     []     []     [x]       LEFT []     []     []     [x]         Limbs:   []    RUE     []    LUE     [x]    RLE    [x]    LLE   Compression: After applying Eucerin lotion, a multi layered compression bandage was applied to bilateral LE's from foot to knee over lymphesoft foam.Pt instructed to remove bandaging if any medical complications ie SOB. Treatment/Session Assessment:    · Response to Treatment:  Pt tolerated treatment session well with no medical complications. Skin was intact and addressed with Eucerin lotion. She is pleased with progress. · Compliance with Program/Exercises: compliant to date  · Recommendations/Intent for next treatment session: \"Next visit will focus on complete decongestive therapy\".   Total Treatment Duration:60min  OT Patient Time In/Time Out  Time In: 1200  Time Out: [address] BRIANNE Cotto

## 2019-09-18 ENCOUNTER — HOSPITAL ENCOUNTER (OUTPATIENT)
Dept: PHYSICAL THERAPY | Age: 64
Discharge: HOME | End: 2019-09-18
Payer: MEDICARE

## 2019-09-18 PROCEDURE — 97140 MANUAL THERAPY 1/> REGIONS: CPT

## 2019-09-18 NOTE — PROGRESS NOTES
Monik Fernandes  : 1955  [place]  Secondary: [employer] Generic [address] at [facility]  [address], [facility], [address]  [phone]   [phone]           OUTPATIENT OCCUPATIONAL THERAPY: Daily Note 2019    ICD-10: Treatment Diagnosis: I89.0 lymphedema not elsewhere classified, M79.604 pain in right leg, M79.605 pain in left leg   Precautions/Allergies:   Augmentin [amoxicillin-pot clavulanate]   Fall Risk Score:    Ambulatory/Rehab Services H2 Model Falls Risk Assessment    Risk Factors:       No Risk Factors Identified Ability to Rise from Chair:       (1)  Pushes up, successful in one attempt    Falls Prevention Plan:       No modifications necessary   Total: (5 or greater = High Risk): 1     [employer]. All Rights Reserved. [facility] [employer] Patent #[id number]. Federal Law prohibits the replication, distribution or use without written permission from [employer]     MD Orders: eval and treat MEDICAL/REFERRING DIAGNOSIS:   Lymphedema, not elsewhere classified [I89.0]   DATE OF ONSET: chronic with worsening symptoms over the past 2 years    REFERRING PHYSICIAN: Mattie Hsu MD  RETURN PHYSICIAN APPOINTMENT: to be determined      INITIAL ASSESSMENT:  Ms. Duard Lanes was referred to OT for the evaluation and treatment of bilateral LE lymphedema. She states swelling is chronic in nature and has gotten worse as her vein issues have gotten worse. She is being seen at [facility] to address her vein issues. DVT was ruled out. She wore 20-30mmHg compression knee highs for > 4 weeks with little success. LE pain, tingling is also present and along with the swelling has decreased her LE tolerance needed for standing and walking. She presents with Stage 2 LE lymphedema with dense fluid/pitting around the ankles, hyperpigmentation and pain.  She would benefit from OT for complete decongestive therapy to reduce swelling before transitioning to compression garments and home program.    PLAN OF CARE:   PROBLEM LIST:  1. Increased Pain  2. Decreased Activity Tolerance  3. Edema/Girth INTERVENTIONS PLANNED  1. Skin care  2. Compression bandaging  3. Fitting for compression garment(s)  4. Manual therapy/Manual lymph drainage  5. Therapeutic exercise/Therapeutic activities  6. Patient Education  7. Compression pump trial prn  8.  kinesiotaping    TREATMENT PLAN:  Effective Dates: 11/11/2019 Frequency/Duration: 2x/week up to 90 days  2 xweek x90 days  and upon reassessment. Will adjust frequency and duration as progress indicates. GOALS: (Goals have been discussed and agreed upon with patient.)  Short-Term Functional Goals: Time Frame: 45 days  1. The patient/caregiver will verbalize understanding of lymphedema precautions. 2. Patient will be independent with skin care regimen to decrease risk of cellulitis. 3. The patient/caregiver will be independent at donning and doffing bilatera lower extremity compression bandages. 4. The patient/caregiver will be independent with self-manual lymph drainage techniques and show decrease in limb volume. 5. Patient will be independent in lymphatic exercises. Discharge Goals: Time Frame: 90 days  1. Patient's bilateral lower extremity circumferential measurements will decrease on volumetric graph by 10-15cm to maximize functional use in ADL's.    2. The patient/caregiver will be independent with home management of lymphedema. 3. Patient/caregiver will be independent donning and doffing bilateral lower extremity compression garment. Rehabilitation Potential For Stated Goals: Good  Regarding [address] therapy, I certify that the treatment plan above will be carried out by a therapist or under their direction.   Thank you for this referral,  León Stephens OT                 The information in this section was collected on 9/18/2019   (except where otherwise noted). OCCUPATIONAL PROFILE & HISTORY:   History of Present Injury/Illness (Reason for Referral):  Ms. Natasha Pollack was referred to OT for the evaluation and treatment of bilateral LE lymphedema. She states swelling is chronic in nature and has gotten worse as her vein issues have gotten worse. She is being seen at [facility] to address her vein issues. DVT was ruled out. She wore 20-30mmHg compression knee highs for > 4 weeks with little success. LE pain, tingling is also present and along with the swelling has decreased her LE tolerance needed for standing and walking. She presents with Stage 2 LE lymphedema with dense fluid/pitting around the ankles, hyperpigmentation and pain. She would benefit from OT for complete decongestive therapy to reduce swelling before transitioning to compression garments and home program.   Past Medical History/Comorbidities:   Ms. Natasha Pollack  has a past medical history of Hypertension and Thyroid Nodule. Ms. Natasha Pollack  has a past surgical history that includes hx orthopaedic. PMH: diabetes, high blood pressure, high cholesterol, varicose veins,leg swelling and pain, lymphedema, DVT ruled out   Social History/Living Environment:     pt lives in home with spouse   Prior Level of Function/Work/Activity:  Retired / - has not been very active since retiring   Dominant Side:         RIGHT  Previous Treatment Approaches:          Conservative therapy : >4 weeks of compression knee highs   Current Medications:    Current Outpatient Medications:     loratadine (CLARITIN REDITABS) 10 mg dissolvable tablet, take 10 mg by mouth daily. , Disp: , Rfl:     lisinopril-hydrochlorothiazide (PRINZIDE, ZESTORETIC) 20-12.5 mg per tablet, take  by mouth daily. 2 tablets daily , Disp: , Rfl:     spironolactone (ALDACTONE) 25 mg tablet, take 25 mg by mouth daily. , Disp: , Rfl:     amlodipine-atorvastatatin (CADUET) 5-10 mg per tablet, take 1 Tab by mouth daily. , Disp: , Rfl:     metoprolol-XL (TOPROL-XL) 100 mg XL tablet, take 100 mg by mouth daily. , Disp: , Rfl:             Date Last Reviewed:  9/18/2019   Complexity Level : Expanded review of therapy/medical records (1-2):  MODERATE COMPLEXITY   ASSESSMENT OF OCCUPATIONAL PERFORMANCE:   Palpation:          Bilateral stage 2 lymphedema with greater involvement in the RLE: fluid has accumulated and is dense/pitting around ankles   ROM:          WFL  Strength:       WFL  Skin Integrity:          Skin is intact with hyperpigmentation and thickening present - multiple varicosities   Sensation:  Intact per pt   Functional Mobility:  Independent but with decreased activity tolerance  Activities of Daily Living : independent   Edema/Girth:  2+   PRETREATMENT AFFECTED LIMB(s): lower extremity      Date:  8-13-19 9-5-19        Right / Left           Groin   []      []           8 inches   []      []           4 inches   []      []         PoplitealSpace   [x]      [x] 47/48 44/44         8 inches   [x]      [x] 43/41 38/38         4 inches   [x]      [x] 33/30 31.5/28.5         Ankle   [x]      [x] 30.5/28 27/28         Instep   [x]      [x] 24.5/24.5 23/23       Measurements are taken in centimeters:  2.54 cm = 1 inch  Total:right 178cm 163.5       left 171.5 161.5             Physical Skills Involved:  1. Activity Tolerance  2. Pain (Chronic)  3. Edema  4. Skin Integrity Cognitive Skills Affected (resulting in the inability to perform in a timely and safe manner):  1. Psychosocial Skills Affected:  1. Habits/Routines  2. Social Interaction   Number of elements that affect the Plan of Care: 3-5:  MODERATE COMPLEXITY   CLINICAL DECISION MAKING:   Outcome Measure: Tool Used: Tool Used: Lymphedema Life Impact Scale   Score:  Initial: 48 Most Recent: X (Date: -- )   Interpretation of Score:  The Lymphedema Life Impact Scale (LLIS) is a validated instrument that measures the physical, functional, and psychosocial concerns pertinent to patients with extremity lymphedema. The Scale's questionnaire is administered to patients to gauge impairments, activity limitations, and participation restrictions resulting from their lymphedema. Score 0 1-13 14-26 27-40 41-54 55-67 68   Modifier CH CI CJ CK CL CM CN     ? Other PT/OT Primary Functional Limitations:     - CURRENT STATUS: CL - 60%-79% impaired, limited or restricted    - GOAL STATUS: CK - 40%-59% impaired, limited or restricted    - D/C STATUS:  ---------------To be determined---------------       Medical Necessity:   · Skilled intervention continues to be required due to uncontrolled swelling increasing risk of cellulitis and hindering ADL's. Reason for Services/Other Comments:  · Patient continues to require skilled intervention due to inability to self manage lymphedema at this time. Clinical Decision-Making Assessment:     Use of outcome tool(s) and clinical judgement create a POC that gives a: Questionable prediction of patient's progress: MODERATE COMPLEXITY   TREATMENT:   (In addition to Assessment/Re-Assessment sessions the following treatments were rendered)    Pre-treatment Symptoms/Complaints:  Overall total of circumferential measurements decreased by 14.5cm in the RLE and 10cm in the LLE. Pain has decreased to 4. She will be out of town on vacation for a week but will keep LE's bandaged and perform home program. Will return to therapy after vacation. Pain: Initial:   Pain Intensity 1: 4  Post Session: 4   Occupational Therapy Treatments:    OT eval(x  ) OT eval was completed 8-13-19. Pt received information on lymphedema and risk reduction/self management practices as outlined by the National Lymphedema Network. Therapeutic Exercise ( minutes):     HEP:  As above; handouts given to patient for all exercises.   Manual Therapy:(60 min): Pt received MLD, skin care and compression bandaging with instruction on home program in preparation for vacation. Manual Lymph Drainage:(60minutes) MLD to decrease bilateral LE lymphedema with instruction on deep breathing and self MLD, LE exercises to perform daily           Lymph Nodes:    Cervical Supraclavicular Axillary Abdominal Inguinal Popliteal Antecubital   RIGHT []     [x]     [x]     [x]     [x]     [x]     []       LEFT []     [x]     [x]     [x]     [x]     [x]     []          Anastamoses:   Axillo-axillary Inguino-inguinal Axillo-inguinal Inguino-axillary   ANTERIOR []     []     []     [x]       POSTERIOR []     []     []     []       RIGHT []     []     []     [x]       LEFT []     []     []     [x]         Limbs:   []    RUE     []    LUE     [x]    RLE    [x]    LLE   Compression: After applying Eucerin lotion, a multi layered compression bandage was applied to bilateral LE's from foot to knee over lymphesoft foam.Pt instructed to remove bandaging if any medical complications ie SOB. She is able to self bandage with good technique. Treatment/Session Assessment:    · Response to Treatment:  Pt tolerated treatment session well with no medical complications. Skin was intact and addressed with Eucerin lotion. She will be on vacation for a week but will continue bandaging. · Compliance with Program/Exercises: compliant to date  · Recommendations/Intent for next treatment session: \"Next visit will focus on complete decongestive therapy\".   Total Treatment Duration:60min  OT Patient Time In/Time Out  Time In: 1210  Time Out: [address] [facility],

## 2019-10-03 ENCOUNTER — HOSPITAL ENCOUNTER (OUTPATIENT)
Dept: PHYSICAL THERAPY | Age: 64
Discharge: HOME | End: 2019-10-03
Payer: MEDICARE

## 2019-10-03 PROCEDURE — 97140 MANUAL THERAPY 1/> REGIONS: CPT

## 2019-10-03 NOTE — PROGRESS NOTES
Bjorn Cline  : 1955  [place]  Secondary: [facility] at [address], [facility] [address]  [phone]   [phone]           OUTPATIENT OCCUPATIONAL THERAPY: Daily Note 10/3/2019    ICD-10: Treatment Diagnosis: I89.0 lymphedema not elsewhere classified, M79.604 pain in right leg, M79.605 pain in left leg   Precautions/Allergies:   Augmentin [amoxicillin-pot clavulanate]   Fall Risk Score:    Ambulatory/Rehab Services H2 Model Falls Risk Assessment    Risk Factors:       No Risk Factors Identified Ability to Rise from Chair:       (1)  Pushes up, successful in one attempt    Falls Prevention Plan:       No modifications necessary   Total: (5 or greater = High Risk): 1     [facility] [employer]. All Rights Reserved. [facility] [employer] Patent #[id number]. Federal Law prohibits the replication, distribution or use without written permission from [employer]     MD Orders: eval and treat MEDICAL/REFERRING DIAGNOSIS:   Lymphedema, not elsewhere classified [I89.0]   DATE OF ONSET: chronic with worsening symptoms over the past 2 years    REFERRING PHYSICIAN: Rowena Araya MD  RETURN PHYSICIAN APPOINTMENT: to be determined      INITIAL ASSESSMENT:  Ms. Oliver Aguirre was referred to OT for the evaluation and treatment of bilateral LE lymphedema. She states swelling is chronic in nature and has gotten worse as her vein issues have gotten worse. She is being seen at [facility] to address her vein issues. DVT was ruled out. She wore 20-30mmHg compression knee highs for > 4 weeks with little success. LE pain, tingling is also present and along with the swelling has decreased her LE tolerance needed for standing and walking. She presents with Stage 2 LE lymphedema with dense fluid/pitting around the ankles, hyperpigmentation and pain.  She would benefit from OT for complete decongestive therapy to reduce swelling before transitioning to compression garments and home program.    PLAN OF CARE:   PROBLEM LIST:  1. Increased Pain  2. Decreased Activity Tolerance  3. Edema/Girth INTERVENTIONS PLANNED  1. Skin care  2. Compression bandaging  3. Fitting for compression garment(s)  4. Manual therapy/Manual lymph drainage  5. Therapeutic exercise/Therapeutic activities  6. Patient Education  7. Compression pump trial prn  8.  kinesiotaping    TREATMENT PLAN:  Effective Dates: 11/11/2019 Frequency/Duration: 2x/week up to 90 days  2 xweek x90 days  and upon reassessment. Will adjust frequency and duration as progress indicates. GOALS: (Goals have been discussed and agreed upon with patient.)  Short-Term Functional Goals: Time Frame: 45 days  1. The patient/caregiver will verbalize understanding of lymphedema precautions. 2. Patient will be independent with skin care regimen to decrease risk of cellulitis. 3. The patient/caregiver will be independent at donning and doffing bilatera lower extremity compression bandages. 4. The patient/caregiver will be independent with self-manual lymph drainage techniques and show decrease in limb volume. 5. Patient will be independent in lymphatic exercises. Discharge Goals: Time Frame: 90 days  1. Patient's bilateral lower extremity circumferential measurements will decrease on volumetric graph by 10-15cm to maximize functional use in ADL's.    2. The patient/caregiver will be independent with home management of lymphedema. 3. Patient/caregiver will be independent donning and doffing bilateral lower extremity compression garment. Rehabilitation Potential For Stated Goals: Good  Regarding [address] [place] [address] Road therapy, I certify that the treatment plan above will be carried out by a therapist or under their direction.   Thank you for this referral,  Srinivas Ty OT                 The information in this section was collected on 10/3/2019   (except where otherwise noted). OCCUPATIONAL PROFILE & HISTORY:   History of Present Injury/Illness (Reason for Referral):  Ms. Ana Brush was referred to OT for the evaluation and treatment of bilateral LE lymphedema. She states swelling is chronic in nature and has gotten worse as her vein issues have gotten worse. She is being seen at [facility] to address her vein issues. DVT was ruled out. She wore 20-30mmHg compression knee highs for > 4 weeks with little success. LE pain, tingling is also present and along with the swelling has decreased her LE tolerance needed for standing and walking. She presents with Stage 2 LE lymphedema with dense fluid/pitting around the ankles, hyperpigmentation and pain. She would benefit from OT for complete decongestive therapy to reduce swelling before transitioning to compression garments and home program.   Past Medical History/Comorbidities:   Ms. Ana Brush  has a past medical history of Hypertension and Thyroid Nodule. Ms. Ana Brush  has a past surgical history that includes hx orthopaedic. PMH: diabetes, high blood pressure, high cholesterol, varicose veins,leg swelling and pain, lymphedema, DVT ruled out   Social History/Living Environment:     pt lives in home with spouse   Prior Level of Function/Work/Activity:  Retired / - has not been very active since retiring   Dominant Side:         RIGHT  Previous Treatment Approaches:          Conservative therapy : >4 weeks of compression knee highs   Current Medications:    Current Outpatient Medications:     loratadine (CLARITIN REDITABS) 10 mg dissolvable tablet, take 10 mg by mouth daily. , Disp: , Rfl:     lisinopril-hydrochlorothiazide (PRINZIDE, ZESTORETIC) 20-12.5 mg per tablet, take  by mouth daily. 2 tablets daily , Disp: , Rfl:     spironolactone (ALDACTONE) 25 mg tablet, take 25 mg by mouth daily. , Disp: , Rfl:     amlodipine-atorvastatatin (CADUET) 5-10 mg per tablet, take 1 Tab by mouth daily. , Disp: , Rfl:     metoprolol-XL (TOPROL-XL) 100 mg XL tablet, take 100 mg by mouth daily. , Disp: , Rfl:             Date Last Reviewed:  10/3/2019   Complexity Level : Expanded review of therapy/medical records (1-2):  MODERATE COMPLEXITY   ASSESSMENT OF OCCUPATIONAL PERFORMANCE:   Palpation:          Bilateral stage 2 lymphedema with greater involvement in the RLE: fluid has accumulated and is dense/pitting around ankles   ROM:          WFL  Strength:       WFL  Skin Integrity:          Skin is intact with hyperpigmentation and thickening present - multiple varicosities   Sensation:  Intact per pt   Functional Mobility:  Independent but with decreased activity tolerance  Activities of Daily Living : independent   Edema/Girth:  2+   PRETREATMENT AFFECTED LIMB(s): lower extremity      Date:  8-13-19 9-5-19 10-3-19       Right / Left           Groin   []      []           8 inches   []      []           4 inches   []      []         PoplitealSpace   [x]      [x] 47/48cm 44/44cm 46/46cm        8 inches   [x]      [x] 43/41 38/38 39/40        4 inches   [x]      [x] 33/30 31.5/28.5 32/30        Ankle   [x]      [x] 30.5/28 27/28 31/29        Instep   [x]      [x] 24.5/24.5 23/23 23.5/25      Measurements are taken in centimeters:  2.54 cm = 1 inch  Total:right 178cm 163.5cm 171.5cm      left 171.5 161.5 170.0cm            Physical Skills Involved:  1. Activity Tolerance  2. Pain (Chronic)  3. Edema  4. Skin Integrity Cognitive Skills Affected (resulting in the inability to perform in a timely and safe manner):  1. Psychosocial Skills Affected:  1. Habits/Routines  2. Social Interaction   Number of elements that affect the Plan of Care: 3-5:  MODERATE COMPLEXITY   CLINICAL DECISION MAKING:   Outcome Measure: Tool Used: Tool Used: Lymphedema Life Impact Scale   Score:  Initial: 48 Most Recent: X (Date: -- )   Interpretation of Score:  The Lymphedema Life Impact Scale (LLIS) is a validated instrument that measures the physical, functional, and psychosocial concerns pertinent to patients with extremity lymphedema. The Scale's questionnaire is administered to patients to gauge impairments, activity limitations, and participation restrictions resulting from their lymphedema. Score 0 1-13 14-26 27-40 41-54 55-67 68   Modifier CH CI CJ CK CL CM CN     ? Other PT/OT Primary Functional Limitations:     - CURRENT STATUS: CL - 60%-79% impaired, limited or restricted    - GOAL STATUS: CK - 40%-59% impaired, limited or restricted    - D/C STATUS:  ---------------To be determined---------------       Medical Necessity:   · Skilled intervention continues to be required due to uncontrolled swelling increasing risk of cellulitis and hindering ADL's. Reason for Services/Other Comments:  · Patient continues to require skilled intervention due to inability to self manage lymphedema at this time. Clinical Decision-Making Assessment:     Use of outcome tool(s) and clinical judgement create a POC that gives a: Questionable prediction of patient's progress: MODERATE COMPLEXITY   TREATMENT:   (In addition to Assessment/Re-Assessment sessions the following treatments were rendered)    Pre-treatment Symptoms/Complaints:  Pt has returned from vacation. While on vacation, she bandaged or wore compression knee highs daily and bandaged at night. Even with consistent use of compression, LE swelling (limb size) increased. The heat and travel time are factors that may have played a role with her increased swelling. Pain: Initial:   Pain Intensity 1: 4  Post Session: 4   Occupational Therapy Treatments:    OT eval(x  ) OT eval was completed 8-13-19. Pt received information on lymphedema and risk reduction/self management practices as outlined by the National Lymphedema Network. Therapeutic Exercise ( minutes):     HEP:  As above; handouts given to patient for all exercises.   Manual Therapy:(60 min): Pt received MLD, skin care and compression bandaging. Measurements made of LE's. Manual Lymph Drainage:(60minutes) MLD to decrease bilateral LE lymphedema with instruction on deep breathing and self MLD, LE exercises to perform daily           Lymph Nodes:    Cervical Supraclavicular Axillary Abdominal Inguinal Popliteal Antecubital   RIGHT []     [x]     [x]     [x]     [x]     [x]     []       LEFT []     [x]     [x]     [x]     [x]     [x]     []          Anastamoses:   Axillo-axillary Inguino-inguinal Axillo-inguinal Inguino-axillary   ANTERIOR []     []     []     [x]       POSTERIOR []     []     []     []       RIGHT []     []     []     [x]       LEFT []     []     []     [x]         Limbs:   []    RUE     []    LUE     [x]    RLE    [x]    LLE   Compression: After applying Eucerin lotion, a multi layered compression bandage was applied to bilateral LE's from foot to knee over lymphesoft foam.Pt instructed to remove bandaging if any medical complications ie SOB. She is able to self bandage with good technique. Treatment/Session Assessment:    · Response to Treatment:  Pt tolerated treatment session well with no medical complications. Skin was intact and addressed with Eucerin lotion. LE limb size increased today after travel/vacation. · Compliance with Program/Exercises: compliant to date  · Recommendations/Intent for next treatment session: \"Next visit will focus on complete decongestive therapy\".   Total Treatment Duration:60min  OT Patient Time In/Time Out  Time In: 0100  Time Out: [address], OT

## 2019-10-10 ENCOUNTER — HOSPITAL ENCOUNTER (OUTPATIENT)
Dept: PHYSICAL THERAPY | Age: 64
Discharge: HOME | End: 2019-10-10
Payer: MEDICARE

## 2019-10-10 PROCEDURE — 97140 MANUAL THERAPY 1/> REGIONS: CPT

## 2019-10-10 NOTE — PROGRESS NOTES
Tala Ovalle  : 1955  [place]  Secondary: [facility]  at [address], [address]  [phone]   [phone]           OUTPATIENT OCCUPATIONAL THERAPY: Daily Note 10/10/2019    ICD-10: Treatment Diagnosis: I89.0 lymphedema not elsewhere classified, M79.604 pain in right leg, M79.605 pain in left leg   Precautions/Allergies:   Augmentin [amoxicillin-pot clavulanate]   Fall Risk Score:    Ambulatory/Rehab Services H2 Model Falls Risk Assessment    Risk Factors:       No Risk Factors Identified Ability to Rise from Chair:       (1)  Pushes up, successful in one attempt    Falls Prevention Plan:       No modifications necessary   Total: (5 or greater = High Risk): 1     [facility] [employer]. All Rights Reserved. [facility] [employer] Patent #[id number]. Federal Law prohibits the replication, distribution or use without written permission from [employer]     MD Orders: eval and treat MEDICAL/REFERRING DIAGNOSIS:   Lymphedema, not elsewhere classified [I89.0]   DATE OF ONSET: chronic with worsening symptoms over the past 2 years    REFERRING PHYSICIAN: Randi Carrera MD  RETURN PHYSICIAN APPOINTMENT: to be determined      INITIAL ASSESSMENT:  Ms. Bibiana Boland was referred to OT for the evaluation and treatment of bilateral LE lymphedema. She states swelling is chronic in nature and has gotten worse as her vein issues have gotten worse. She is being seen at [facility] to address her vein issues. DVT was ruled out. She wore 20-30mmHg compression knee highs for > 4 weeks with little success. LE pain, tingling is also present and along with the swelling has decreased her LE tolerance needed for standing and walking. She presents with Stage 2 LE lymphedema with dense fluid/pitting around the ankles, hyperpigmentation and pain.  She would benefit from OT for complete decongestive therapy to reduce swelling before transitioning to compression garments and home program.    PLAN OF CARE:   PROBLEM LIST:  1. Increased Pain  2. Decreased Activity Tolerance  3. Edema/Girth INTERVENTIONS PLANNED  1. Skin care  2. Compression bandaging  3. Fitting for compression garment(s)  4. Manual therapy/Manual lymph drainage  5. Therapeutic exercise/Therapeutic activities  6. Patient Education  7. Compression pump trial prn  8.  kinesiotaping    TREATMENT PLAN:  Effective Dates: 11/11/2019 Frequency/Duration: 2x/week up to 90 days  2 xweek x90 days  and upon reassessment. Will adjust frequency and duration as progress indicates. GOALS: (Goals have been discussed and agreed upon with patient.)  Short-Term Functional Goals: Time Frame: 45 days  1. The patient/caregiver will verbalize understanding of lymphedema precautions. 2. Patient will be independent with skin care regimen to decrease risk of cellulitis. 3. The patient/caregiver will be independent at donning and doffing bilatera lower extremity compression bandages. 4. The patient/caregiver will be independent with self-manual lymph drainage techniques and show decrease in limb volume. 5. Patient will be independent in lymphatic exercises. Discharge Goals: Time Frame: 90 days  1. Patient's bilateral lower extremity circumferential measurements will decrease on volumetric graph by 10-15cm to maximize functional use in ADL's.    2. The patient/caregiver will be independent with home management of lymphedema. 3. Patient/caregiver will be independent donning and doffing bilateral lower extremity compression garment. Rehabilitation Potential For Stated Goals: Good  Regarding [address] [place] [address] Road therapy, I certify that the treatment plan above will be carried out by a therapist or under their direction.   Thank you for this referral,  Logan Thurman OT                 The information in this section was collected on 10/10/2019   (except where otherwise noted). OCCUPATIONAL PROFILE & HISTORY:   History of Present Injury/Illness (Reason for Referral):  Ms. Sathya David was referred to OT for the evaluation and treatment of bilateral LE lymphedema. She states swelling is chronic in nature and has gotten worse as her vein issues have gotten worse. She is being seen at [facility] to address her vein issues. DVT was ruled out. She wore 20-30mmHg compression knee highs for > 4 weeks with little success. LE pain, tingling is also present and along with the swelling has decreased her LE tolerance needed for standing and walking. She presents with Stage 2 LE lymphedema with dense fluid/pitting around the ankles, hyperpigmentation and pain. She would benefit from OT for complete decongestive therapy to reduce swelling before transitioning to compression garments and home program.   Past Medical History/Comorbidities:   Ms. Sathya David  has a past medical history of Hypertension and Thyroid Nodule. Ms. Sathya David  has a past surgical history that includes hx orthopaedic. PMH: diabetes, high blood pressure, high cholesterol, varicose veins,leg swelling and pain, lymphedema, DVT ruled out   Social History/Living Environment:     pt lives in home with spouse   Prior Level of Function/Work/Activity:  Retired / - has not been very active since retiring   Dominant Side:         RIGHT  Previous Treatment Approaches:          Conservative therapy : >4 weeks of compression knee highs   Current Medications:    Current Outpatient Medications:     loratadine (CLARITIN REDITABS) 10 mg dissolvable tablet, take 10 mg by mouth daily. , Disp: , Rfl:     lisinopril-hydrochlorothiazide (PRINZIDE, ZESTORETIC) 20-12.5 mg per tablet, take  by mouth daily. 2 tablets daily , Disp: , Rfl:     spironolactone (ALDACTONE) 25 mg tablet, take 25 mg by mouth daily. , Disp: , Rfl:     amlodipine-atorvastatatin (CADUET) 5-10 mg per tablet, take 1 Tab by mouth daily. , Disp: , Rfl:     metoprolol-XL (TOPROL-XL) 100 mg XL tablet, take 100 mg by mouth daily. , Disp: , Rfl:             Date Last Reviewed:  10/10/2019   Complexity Level : Expanded review of therapy/medical records (1-2):  MODERATE COMPLEXITY   ASSESSMENT OF OCCUPATIONAL PERFORMANCE:   Palpation:          Bilateral stage 2 lymphedema with greater involvement in the RLE: fluid has accumulated and is dense/pitting around ankles   ROM:          WFL  Strength:       WFL  Skin Integrity:          Skin is intact with hyperpigmentation and thickening present - multiple varicosities   Sensation:  Intact per pt   Functional Mobility:  Independent but with decreased activity tolerance  Activities of Daily Living : independent   Edema/Girth:  2+   PRETREATMENT AFFECTED LIMB(s): lower extremity      Date:  8-13-19 9-5-19 10-3-19       Right / Left           Groin   []      []           8 inches   []      []           4 inches   []      []         PoplitealSpace   [x]      [x] 47/48cm 44/44cm 46/46cm        8 inches   [x]      [x] 43/41 38/38 39/40        4 inches   [x]      [x] 33/30 31.5/28.5 32/30        Ankle   [x]      [x] 30.5/28 27/28 31/29        Instep   [x]      [x] 24.5/24.5 23/23 23.5/25      Measurements are taken in centimeters:  2.54 cm = 1 inch  Total:right 178cm 163.5cm 171.5cm      left 171.5 161.5 170.0cm            Physical Skills Involved:  1. Activity Tolerance  2. Pain (Chronic)  3. Edema  4. Skin Integrity Cognitive Skills Affected (resulting in the inability to perform in a timely and safe manner):  1. Psychosocial Skills Affected:  1. Habits/Routines  2. Social Interaction   Number of elements that affect the Plan of Care: 3-5:  MODERATE COMPLEXITY   CLINICAL DECISION MAKING:   Outcome Measure: Tool Used: Tool Used: Lymphedema Life Impact Scale   Score:  Initial: 48 Most Recent: X (Date: -- )   Interpretation of Score:  The Lymphedema Life Impact Scale (LLIS) is a validated instrument that measures the physical, functional, and psychosocial concerns pertinent to patients with extremity lymphedema. The Scale's questionnaire is administered to patients to gauge impairments, activity limitations, and participation restrictions resulting from their lymphedema. Score 0 1-13 14-26 27-40 41-54 55-67 68   Modifier CH CI CJ CK CL CM CN     ? Other PT/OT Primary Functional Limitations:     - CURRENT STATUS: CL - 60%-79% impaired, limited or restricted    - GOAL STATUS: CK - 40%-59% impaired, limited or restricted    - D/C STATUS:  ---------------To be determined---------------       Medical Necessity:   · Skilled intervention continues to be required due to uncontrolled swelling increasing risk of cellulitis and hindering ADL's. Reason for Services/Other Comments:  · Patient continues to require skilled intervention due to inability to self manage lymphedema at this time. Clinical Decision-Making Assessment:     Use of outcome tool(s) and clinical judgement create a POC that gives a: Questionable prediction of patient's progress: MODERATE COMPLEXITY   TREATMENT:   (In addition to Assessment/Re-Assessment sessions the following treatments were rendered)    Pre-treatment Symptoms/Complaints:  Pt.'s insurance approved entre compression pump for home use - pt will meet with [facility] [place] [facility] today. Pt has strong family history of lymphedema - stated today that mother and brother both with LE lymphedema and her swelling started in her teenage years. Pain: Initial:   Pain Intensity 1: 3  Post Session: 3   Occupational Therapy Treatments:    OT eval(x  ) OT eval was completed 8-13-19. Pt received information on lymphedema and risk reduction/self management practices as outlined by the National Lymphedema Network. Therapeutic Exercise ( minutes):     HEP:  As above; handouts given to patient for all exercises.   Manual Therapy:(60 min): Pt received MLD, skin care and compression bandaging. Manual Lymph Drainage:(60minutes) MLD to decrease bilateral LE lymphedema with instruction on deep breathing and self MLD, LE exercises to perform daily           Lymph Nodes:    Cervical Supraclavicular Axillary Abdominal Inguinal Popliteal Antecubital   RIGHT []     [x]     [x]     [x]     [x]     [x]     []       LEFT []     [x]     [x]     [x]     [x]     [x]     []          Anastamoses:   Axillo-axillary Inguino-inguinal Axillo-inguinal Inguino-axillary   ANTERIOR []     []     []     [x]       POSTERIOR []     []     []     []       RIGHT []     []     []     [x]       LEFT []     []     []     [x]         Limbs:   []    RUE     []    LUE     [x]    RLE    [x]    LLE   Compression: After applying Eucerin lotion, a multi layered compression bandage was applied to bilateral LE's from foot to knee over lymphesoft foam.Pt instructed to remove bandaging if any medical complications ie SOB. She is able to self bandage with good technique. She is checking to see if her insurance will pay for compression garments before we order her a new pair of 20-30mmHg knee highs. Treatment/Session Assessment:    · Response to Treatment:  Pt tolerated treatment session well with no medical complications. Skin was intact and addressed with Eucerin lotion. Pt demonstrates good self bandaging technique and is compliant with POC. We are working on getting all of the pieces of her home program in place ie compression garments, Entre pump in order for pt to be able to successfully manage condition. · Compliance with Program/Exercises: compliant to date  · Recommendations/Intent for next treatment session: \"Next visit will focus on complete decongestive therapy\".   Total Treatment Duration:60min  OT Patient Time In/Time Out  Time In: 0100  Time Out: [address], OT

## 2019-10-17 ENCOUNTER — HOSPITAL ENCOUNTER (OUTPATIENT)
Dept: PHYSICAL THERAPY | Age: 64
Discharge: HOME | End: 2019-10-17
Payer: MEDICARE

## 2019-10-17 PROCEDURE — 97140 MANUAL THERAPY 1/> REGIONS: CPT

## 2019-10-17 NOTE — PROGRESS NOTES
Narcisa Hunt  : 1955  [place]  Secondary: [employer] [facility]  at [address], [address]  [phone]   [phone]           OUTPATIENT OCCUPATIONAL THERAPY: Daily Note 10/17/2019    ICD-10: Treatment Diagnosis: I89.0 lymphedema not elsewhere classified, M79.604 pain in right leg, M79.605 pain in left leg   Precautions/Allergies:   Augmentin [amoxicillin-pot clavulanate]   Fall Risk Score:    Ambulatory/Rehab Services H2 Model Falls Risk Assessment    Risk Factors:       No Risk Factors Identified Ability to Rise from Chair:       (1)  Pushes up, successful in one attempt    Falls Prevention Plan:       No modifications necessary   Total: (5 or greater = High Risk): 1     [employer]. All Rights Reserved. [facility] [employer] Patent #[id number]. Federal Law prohibits the replication, distribution or use without written permission from [employer]     MD Orders: eval and treat MEDICAL/REFERRING DIAGNOSIS:   Lymphedema, not elsewhere classified [I89.0]   DATE OF ONSET: chronic with worsening symptoms over the past 2 years    REFERRING PHYSICIAN: Chan Ladd MD  RETURN PHYSICIAN APPOINTMENT: to be determined      INITIAL ASSESSMENT:  Ms. Brennon Yanez was referred to OT for the evaluation and treatment of bilateral LE lymphedema. She states swelling is chronic in nature and has gotten worse as her vein issues have gotten worse. She is being seen at [facility] to address her vein issues. DVT was ruled out. She wore 20-30mmHg compression knee highs for > 4 weeks with little success. LE pain, tingling is also present and along with the swelling has decreased her LE tolerance needed for standing and walking. She presents with Stage 2 LE lymphedema with dense fluid/pitting around the ankles, hyperpigmentation and pain.  She would benefit from OT for complete decongestive therapy to reduce swelling before transitioning to compression garments and home program.    PLAN OF CARE:   PROBLEM LIST:  1. Increased Pain  2. Decreased Activity Tolerance  3. Edema/Girth INTERVENTIONS PLANNED  1. Skin care  2. Compression bandaging  3. Fitting for compression garment(s)  4. Manual therapy/Manual lymph drainage  5. Therapeutic exercise/Therapeutic activities  6. Patient Education  7. Compression pump trial prn  8.  kinesiotaping    TREATMENT PLAN:  Effective Dates: 11/11/2019 Frequency/Duration: 2x/week up to 90 days  2 xweek x90 days  and upon reassessment. Will adjust frequency and duration as progress indicates. GOALS: (Goals have been discussed and agreed upon with patient.)  Short-Term Functional Goals: Time Frame: 45 days  1. The patient/caregiver will verbalize understanding of lymphedema precautions. 2. Patient will be independent with skin care regimen to decrease risk of cellulitis. 3. The patient/caregiver will be independent at donning and doffing bilatera lower extremity compression bandages. 4. The patient/caregiver will be independent with self-manual lymph drainage techniques and show decrease in limb volume. 5. Patient will be independent in lymphatic exercises. Discharge Goals: Time Frame: 90 days  1. Patient's bilateral lower extremity circumferential measurements will decrease on volumetric graph by 10-15cm to maximize functional use in ADL's.    2. The patient/caregiver will be independent with home management of lymphedema. 3. Patient/caregiver will be independent donning and doffing bilateral lower extremity compression garment. Rehabilitation Potential For Stated Goals: Good  Regarding [address] therapy, I certify that the treatment plan above will be carried out by a therapist or under their direction.   Thank you for this referral,  Martina Greer OT                 The information in this section was collected on 10/17/2019   (except where otherwise noted). OCCUPATIONAL PROFILE & HISTORY:   History of Present Injury/Illness (Reason for Referral):  Ms. Ely Albright was referred to OT for the evaluation and treatment of bilateral LE lymphedema. She states swelling is chronic in nature and has gotten worse as her vein issues have gotten worse. She is being seen at [facility] to address her vein issues. DVT was ruled out. She wore 20-30mmHg compression knee highs for > 4 weeks with little success. LE pain, tingling is also present and along with the swelling has decreased her LE tolerance needed for standing and walking. She presents with Stage 2 LE lymphedema with dense fluid/pitting around the ankles, hyperpigmentation and pain. She would benefit from OT for complete decongestive therapy to reduce swelling before transitioning to compression garments and home program.   Past Medical History/Comorbidities:   Ms. Ely Albright  has a past medical history of Hypertension and Thyroid Nodule. Ms. Ely Albright  has a past surgical history that includes hx orthopaedic. PMH: diabetes, high blood pressure, high cholesterol, varicose veins,leg swelling and pain, lymphedema, DVT ruled out   Social History/Living Environment:     pt lives in home with spouse   Prior Level of Function/Work/Activity:  Retired / - has not been very active since retiring   Dominant Side:         RIGHT  Previous Treatment Approaches:          Conservative therapy : >4 weeks of compression knee highs   Current Medications:    Current Outpatient Medications:     loratadine (CLARITIN REDITABS) 10 mg dissolvable tablet, take 10 mg by mouth daily. , Disp: , Rfl:     lisinopril-hydrochlorothiazide (PRINZIDE, ZESTORETIC) 20-12.5 mg per tablet, take  by mouth daily. 2 tablets daily , Disp: , Rfl:     spironolactone (ALDACTONE) 25 mg tablet, take 25 mg by mouth daily. , Disp: , Rfl:     amlodipine-atorvastatatin (CADUET) 5-10 mg per tablet, take 1 Tab by mouth daily. , Disp: , Rfl:     metoprolol-XL (TOPROL-XL) 100 mg XL tablet, take 100 mg by mouth daily. , Disp: , Rfl:             Date Last Reviewed:  10/17/2019   Complexity Level : Expanded review of therapy/medical records (1-2):  MODERATE COMPLEXITY   ASSESSMENT OF OCCUPATIONAL PERFORMANCE:   Palpation:          Bilateral stage 2 lymphedema with greater involvement in the RLE: fluid has accumulated and is dense/pitting around ankles   ROM:          WFL  Strength:       WFL  Skin Integrity:          Skin is intact with hyperpigmentation and thickening present - multiple varicosities   Sensation:  Intact per pt   Functional Mobility:  Independent but with decreased activity tolerance  Activities of Daily Living : independent   Edema/Girth:  2+   PRETREATMENT AFFECTED LIMB(s): lower extremity      Date:  8-13-19 9-5-19 10-3-19       Right / Left           Groin   []      []           8 inches   []      []           4 inches   []      []         PoplitealSpace   [x]      [x] 47/48cm 44/44cm 46/46cm        8 inches   [x]      [x] 43/41 38/38 39/40        4 inches   [x]      [x] 33/30 31.5/28.5 32/30        Ankle   [x]      [x] 30.5/28 27/28 31/29        Instep   [x]      [x] 24.5/24.5 23/23 23.5/25      Measurements are taken in centimeters:  2.54 cm = 1 inch  Total:right 178cm 163.5cm 171.5cm      left 171.5 161.5 170.0cm            Physical Skills Involved:  1. Activity Tolerance  2. Pain (Chronic)  3. Edema  4. Skin Integrity Cognitive Skills Affected (resulting in the inability to perform in a timely and safe manner):  1. Psychosocial Skills Affected:  1. Habits/Routines  2. Social Interaction   Number of elements that affect the Plan of Care: 3-5:  MODERATE COMPLEXITY   CLINICAL DECISION MAKING:   Outcome Measure: Tool Used: Tool Used: Lymphedema Life Impact Scale   Score:  Initial: 48 Most Recent: X (Date: -- )   Interpretation of Score:  The Lymphedema Life Impact Scale (LLIS) is a validated instrument that measures the physical, functional, and psychosocial concerns pertinent to patients with extremity lymphedema. The Scale's questionnaire is administered to patients to gauge impairments, activity limitations, and participation restrictions resulting from their lymphedema. Score 0 1-13 14-26 27-40 41-54 55-67 68   Modifier CH CI CJ CK CL CM CN     ? Other PT/OT Primary Functional Limitations:     - CURRENT STATUS: CL - 60%-79% impaired, limited or restricted    - GOAL STATUS: CK - 40%-59% impaired, limited or restricted    - D/C STATUS:  ---------------To be determined---------------       Medical Necessity:   · Skilled intervention continues to be required due to uncontrolled swelling increasing risk of cellulitis and hindering ADL's. Reason for Services/Other Comments:  · Patient continues to require skilled intervention due to inability to self manage lymphedema at this time. Clinical Decision-Making Assessment:     Use of outcome tool(s) and clinical judgement create a POC that gives a: Questionable prediction of patient's progress: MODERATE COMPLEXITY   TREATMENT:   (In addition to Assessment/Re-Assessment sessions the following treatments were rendered)    Pre-treatment Symptoms/Complaints: Pt received Entre pump today. Will go to [employer] to order class 1 compression knee highs. RTC in 2 weeks after having used pump daily and procuring effective compression hose. Pain: Initial:   Pain Intensity 1: 3  Post Session: 3   Occupational Therapy Treatments:    Therapeutic Exercise ( minutes):     HEP:  As above; handouts given to patient for all exercises.   Manual Therapy:(60 min): Pt received MLD, skin care and intermittent compression pump treatment          Manual Lymph Drainage:(60minutes) MLD to decrease bilateral LE lymphedema with instruction on deep breathing and self MLD, LE exercises to perform daily           Lymph Nodes:    Cervical Supraclavicular Axillary Abdominal Inguinal Popliteal Antecubital   RIGHT []     [x]     [x]     [x]     [x]     [x]     []       LEFT []     [x]     [x]     [x]     [x]     [x]     []          Anastamoses:   Axillo-axillary Inguino-inguinal Axillo-inguinal Inguino-axillary   ANTERIOR []     []     []     [x]       POSTERIOR []     []     []     []       RIGHT []     []     []     [x]       LEFT []     []     []     [x]         Limbs:   []    RUE     []    LUE     [x]    RLE    [x]    LLE   Compression: Bandaging deferred as pt is going to try pump, as she received it prior to treatment session today. She is able to self bandage with good technique. She is going to order a new pair of 20-30mmHg knee highs through [facility] [employer]. Treatment/Session Assessment:    · Response to Treatment:  Pt tolerated treatment session well with no medical complications. Skin was intact and addressed with Eucerin lotion. Pt demonstrates good self bandaging technique and is compliant with POC. We are working on getting all of the pieces of her home program in place ie compression garments, Entre pump in order for pt to be able to successfully manage condition. She received the pump today; she will order class 1 hose. Pt to RTC in 2 weeks for reassessment and to address any problems with home management. · Compliance with Program/Exercises: compliant to date  · Recommendations/Intent for next treatment session:  \"Next visit will focus on discharge planning\"  Total Treatment Duration:60min  OT Patient Time In/Time Out  Time In: 0100  Time Out: [address] [facility],

## 2019-10-24 ENCOUNTER — APPOINTMENT (OUTPATIENT)
Dept: PHYSICAL THERAPY | Age: 64
End: 2019-10-24
Payer: MEDICARE

## 2019-10-31 ENCOUNTER — APPOINTMENT (OUTPATIENT)
Dept: PHYSICAL THERAPY | Age: 64
End: 2019-10-31
Payer: MEDICARE

## 2019-12-19 ENCOUNTER — HOSPITAL ENCOUNTER (OUTPATIENT)
Dept: PHYSICAL THERAPY | Age: 64
Discharge: HOME | End: 2019-12-19
Payer: MEDICARE

## 2019-12-19 PROCEDURE — 97140 MANUAL THERAPY 1/> REGIONS: CPT

## 2019-12-19 NOTE — THERAPY DISCHARGE
Lacy Kerr 
: 1955 [place] Secondary: [facility] at [address], [facility] [address] [phone]   [phone] OUTPATIENT OCCUPATIONAL THERAPY: Daily Note, Recertification and Discharge 2019 ICD-10: Treatment Diagnosis: I89.0 lymphedema not elsewhere classified, M79.604 pain in right leg, M79.605 pain in left leg Precautions/Allergies:  
Augmentin [amoxicillin-pot clavulanate] Fall Risk Score:  
 Ambulatory/Rehab Services H2 Model Falls Risk Assessment Risk Factors: 
     No Risk Factors Identified Ability to Rise from Chair: 
     (1)  Pushes up, successful in one attempt Falls Prevention Plan: No modifications necessary Total: (5 or greater = High Risk): 1  [facility] States Patent #[id number]. Federal Law prohibits the replication, distribution or use without written permission from [facility] MD Orders: eval and treat MEDICAL/REFERRING DIAGNOSIS:  
Lymphedema, not elsewhere classified [I89.0] DATE OF ONSET: chronic with worsening symptoms over the past 2 years REFERRING PHYSICIAN: Lc Mendez MD 
RETURN PHYSICIAN APPOINTMENT: to be determined Discharge Summary: Ms. Epimenio Mortimer was seen for 10 OT sessions between 19 and 19 and met all OT goals. Before returning today, she had not been seen since 10-17-19 due to back trouble but performed home program daily: daily, meticulous skin/nail care, use of compression garments or bandages as appropriate, self MLD, sequential chambered compression pump, and LE exercises to promote lymph flow. She was independent and successful with home program. Overall total of circumferential measurements decreased by 12.5cm in the RLE and 12cm in the LLE. LE pain decreased by 7 grades from 8 to 1.  Swelling has reduced and stabilized and she is ready for discharge from OT. INITIAL ASSESSMENT:  Ms. Cresencio Ac was referred to OT for the evaluation and treatment of bilateral LE lymphedema. She states swelling is chronic in nature and has gotten worse as her vein issues have gotten worse. She is being seen at [facility] to address her vein issues. DVT was ruled out. She wore 20-30mmHg compression knee highs for > 4 weeks with little success. LE pain, tingling is also present and along with the swelling has decreased her LE tolerance needed for standing and walking. She presents with Stage 2 LE lymphedema with dense fluid/pitting around the ankles, hyperpigmentation and pain. She would benefit from OT for complete decongestive therapy to reduce swelling before transitioning to compression garments and home program.   
PLAN OF CARE:  
PROBLEM LIST: 
1. Increased Pain 2. Decreased Activity Tolerance 3. Edema/Girth INTERVENTIONS PLANNED 1. Skin care 2. Compression bandaging 3. Fitting for compression garment(s) 4. Manual therapy/Manual lymph drainage 5. Therapeutic exercise/Therapeutic activities 6. Patient Education 7. Compression pump trial prn 
8.  kinesiotaping TREATMENT PLAN: 
Effective Dates: 12/19/19 to 12/19/19 Frequency/Duration: 1x for the week of 12/19/19 then discharge from OT 
2 xweek x90 days  and upon reassessment. Will adjust frequency and duration as progress indicates. GOALS: (Goals have been discussed and agreed upon with patient.) Short-Term Functional Goals: Time Frame: 45 days 1. The patient/caregiver will verbalize understanding of lymphedema precautions. Goal met 2. Patient will be independent with skin care regimen to decrease risk of cellulitis. Goal met 3. The patient/caregiver will be independent at donning and doffing bilatera lower extremity compression bandages. Goal met 4.  The patient/caregiver will be independent with self-manual lymph drainage techniques and show decrease in limb volume. Goal met 5. Patient will be independent in lymphatic exercises. Goal met Discharge Goals: Time Frame: 90 days 1. Patient's bilateral lower extremity circumferential measurements will decrease on volumetric graph by 10-15cm to maximize functional use in ADL's. Goal met 2. The patient/caregiver will be independent with home management of lymphedema. Goal met 3. Patient/caregiver will be independent donning and doffing bilateral lower extremity compression garment. Goal met Rehabilitation Potential For Stated Goals: Good Regarding [address] therapy, I certify that the treatment plan above will be carried out by a therapist or under their direction. Thank you for this referral, Brandon Navarrete OT Referring Physician Signature: Lc Mendez MD _______________________________ Date _____________ The information in this section was collected on 12/19/2019 
 (except where otherwise noted). OCCUPATIONAL PROFILE & HISTORY:  
History of Present Injury/Illness (Reason for Referral): Ms. Fahad Sepulveda was referred to OT for the evaluation and treatment of bilateral LE lymphedema. She states swelling is chronic in nature and has gotten worse as her vein issues have gotten worse. She is being seen at [facility] to address her vein issues. DVT was ruled out. She wore 20-30mmHg compression knee highs for > 4 weeks with little success. LE pain, tingling is also present and along with the swelling has decreased her LE tolerance needed for standing and walking. She presents with Stage 2 LE lymphedema with dense fluid/pitting around the ankles, hyperpigmentation and pain. She would benefit from OT for complete decongestive therapy to reduce swelling before transitioning to compression garments and home program.  
Past Medical History/Comorbidities: Ms. Fahad Sepulveda  has a past medical history of Hypertension and Thyroid Nodule. Ms. Vonda Rosado  has a past surgical history that includes hx orthopaedic. PMH: diabetes, high blood pressure, high cholesterol, varicose veins,leg swelling and pain, lymphedema, DVT ruled out Social History/Living Environment:  
  pt lives in home with spouse Prior Level of Function/Work/Activity: 
Retired / - has not been very active since retiring Dominant Side:  
      RIGHT Previous Treatment Approaches:   
      Conservative therapy : >4 weeks of compression knee highs Current Medications:   
Current Outpatient Medications:  
  loratadine (CLARITIN REDITABS) 10 mg dissolvable tablet, take 10 mg by mouth daily. , Disp: , Rfl:  
  lisinopril-hydrochlorothiazide (PRINZIDE, ZESTORETIC) 20-12.5 mg per tablet, take  by mouth daily. 2 tablets daily , Disp: , Rfl:  
  spironolactone (ALDACTONE) 25 mg tablet, take 25 mg by mouth daily. , Disp: , Rfl:  
  amlodipine-atorvastatatin (CADUET) 5-10 mg per tablet, take 1 Tab by mouth daily. , Disp: , Rfl:  
  metoprolol-XL (TOPROL-XL) 100 mg XL tablet, take 100 mg by mouth daily. , Disp: , Rfl:   
        
Date Last Reviewed:  12/19/2019 Complexity Level : Expanded review of therapy/medical records (1-2):  MODERATE COMPLEXITY ASSESSMENT OF OCCUPATIONAL PERFORMANCE:  
Palpation:   
      Bilateral stage 2 lymphedema with greater involvement in the RLE 
ROM:   
      [facility] Strength: WFL Skin Integrity:   
      Skin is intact and she is compliant with daily skin care Sensation:  Intact per pt Functional Mobility:  Independent but with decreased activity tolerance Activities of Daily Living : independent Edema/Girth:  2+ PRETREATMENT AFFECTED LIMB(s): lower extremity Date:  8-13-19 9-5-19 10-3-19 12-19-19 Right / Left Groin  
[]      []        
 
8 inches  
[]      []        
 
4 inches  
[]      [] PoplitealSpace  
[x]      [x] 47/48cm 44/44cm 46/46cm 45/44    
 
8 inches [x]      [x] 43/41 38/38 39/40 38/37    
 
4 inches  
[x]      [x] 33/30 31.5/28.5 32/30 30/28 Ankle  
[x]      [x] 30.5/28 27/28 31/29 29/27 Instep [x]      [x] 24.5/24.5 23/23 23.5/25 23.5/23.5 Measurements are taken in centimeters:  2.54 cm = 1 inch Total:right 178cm 163.5cm 171.5cm 165.5    
left 171.5 161.5 170.0cm 159.5 Physical Skills Involved: 1. Activity Tolerance 2. Pain (Chronic) 3. Edema 4. Skin Integrity Cognitive Skills Affected (resulting in the inability to perform in a timely and safe manner): 1. Psychosocial Skills Affected: 1. Habits/Routines 2. Social Interaction Number of elements that affect the Plan of Care: 3-5:  MODERATE COMPLEXITY CLINICAL DECISION MAKING:  
Outcome Measure: Tool Used: Tool Used: Lymphedema Life Impact Scale Score:  Initial: 48 Most Recent: 13 (Date: 12-19-19 ) Interpretation of Score: The Lymphedema Life Impact Scale (LLIS) is a validated instrument that measures the physical, functional, and psychosocial concerns pertinent to patients with extremity lymphedema. The Scale's questionnaire is administered to patients to gauge impairments, activity limitations, and participation restrictions resulting from their lymphedema. Score 0 1-13 14-26 27-40 41-54 55-67 68 Modifier CH CI CJ CK CL CM CN  
 
? Other PT/OT Primary Functional Limitations:  
  - CURRENT STATUS: CI - 1%-19% impaired, limited or restricted  - GOAL STATUS: CK - 40%-59% impaired, limited or restricted  - D/C STATUS:  CI - 1%-19% impaired, limited or restricted Clinical Decision-Making Assessment:    
Use of outcome tool(s) and clinical judgement create a POC that gives a: Questionable prediction of patient's progress: MODERATE COMPLEXITY  
TREATMENT:  
(In addition to Assessment/Re-Assessment sessions the following treatments were rendered) Pre-treatment Symptoms/Complaints: Swelling has reduced and stabilized and she is ready for discharge from OT. Pain: Initial:  
Pain Intensity 1: 1  Post Session: 1 Occupational Therapy Treatments: 
 
Therapeutic Exercise ( minutes): HEP:  As above; handouts given to patient for all exercises. Manual Therapy:(60 min): Measurements made of LEs, self MLD reviewed, pt measured for compression knee highs and given contact information for [employer] to order biagrip and short stretch bandages as needed Manual Lymph Drainage:self MLD instruction/review Lymph Nodes:  
 Cervical Supraclavicular Axillary Abdominal Inguinal Popliteal Antecubital  
RIGHT []     [x]     [x]     [x]     [x]     [x]     [] LEFT []     [x]     [x]     [x]     [x]     [x]     [] Anastamoses: Axillo-axillary Inguino-inguinal Axillo-inguinal Inguino-axillary ANTERIOR []     []     []     [x] POSTERIOR []     []     []     []      
RIGHT []     []     []     [x] LEFT []     []     []     [x] Limbs:   []    RUE     []    LUE     [x]    RLE    [x]    LLE Compression: All compression knees have been met. Pt has contact info for local DME and online lymphedema product store to order supplies as needed Treatment/Session Assessment:   
· Response to Treatment:  Pt is independent in self management of lymphedema and ready for discharge Total Treatment Duration:60min OT Patient Time In/Time Out Time In: 0200 Time Out: 0300 Julio Sutherland OT

## 2022-04-06 ENCOUNTER — TRANSCRIBE ORDER (OUTPATIENT)
Dept: SCHEDULING | Age: 67
End: 2022-04-06

## 2022-04-06 DIAGNOSIS — Z13.820 ENCOUNTER FOR SCREENING FOR OSTEOPOROSIS: Primary | ICD-10-CM

## 2022-04-06 DIAGNOSIS — Z12.31 SCREENING MAMMOGRAM FOR BREAST CANCER: Primary | ICD-10-CM

## 2022-04-06 DIAGNOSIS — M81.0 AGE-RELATED OSTEOPOROSIS WITHOUT CURRENT PATHOLOGICAL FRACTURE: ICD-10-CM
